# Patient Record
Sex: FEMALE | Race: OTHER | HISPANIC OR LATINO | URBAN - METROPOLITAN AREA
[De-identification: names, ages, dates, MRNs, and addresses within clinical notes are randomized per-mention and may not be internally consistent; named-entity substitution may affect disease eponyms.]

---

## 2020-10-19 ENCOUNTER — EMERGENCY (EMERGENCY)
Facility: HOSPITAL | Age: 50
LOS: 1 days | Discharge: HOME | End: 2020-10-19
Attending: EMERGENCY MEDICINE
Payer: MEDICAID

## 2020-10-19 VITALS
DIASTOLIC BLOOD PRESSURE: 87 MMHG | HEART RATE: 77 BPM | OXYGEN SATURATION: 99 % | TEMPERATURE: 99 F | SYSTOLIC BLOOD PRESSURE: 142 MMHG | RESPIRATION RATE: 17 BRPM

## 2020-10-19 VITALS
OXYGEN SATURATION: 99 % | RESPIRATION RATE: 20 BRPM | TEMPERATURE: 98 F | SYSTOLIC BLOOD PRESSURE: 162 MMHG | DIASTOLIC BLOOD PRESSURE: 80 MMHG | HEART RATE: 85 BPM | WEIGHT: 238.98 LBS | HEIGHT: 69 IN

## 2020-10-19 LAB
ABO RH CONFIRMATION: SIGNIFICANT CHANGE UP
ALBUMIN SERPL ELPH-MCNC: 3.5 G/DL — SIGNIFICANT CHANGE UP (ref 3.5–5)
ALP SERPL-CCNC: 70 U/L — SIGNIFICANT CHANGE UP (ref 40–120)
ALT FLD-CCNC: 25 U/L DA — SIGNIFICANT CHANGE UP (ref 10–60)
ANION GAP SERPL CALC-SCNC: 7 MMOL/L — SIGNIFICANT CHANGE UP (ref 5–17)
APTT BLD: 25.3 SEC — LOW (ref 27.5–35.5)
AST SERPL-CCNC: 18 U/L — SIGNIFICANT CHANGE UP (ref 10–40)
BILIRUB SERPL-MCNC: 0.4 MG/DL — SIGNIFICANT CHANGE UP (ref 0.2–1.2)
BUN SERPL-MCNC: 10 MG/DL — SIGNIFICANT CHANGE UP (ref 7–18)
CALCIUM SERPL-MCNC: 8.6 MG/DL — SIGNIFICANT CHANGE UP (ref 8.4–10.5)
CHLORIDE SERPL-SCNC: 104 MMOL/L — SIGNIFICANT CHANGE UP (ref 96–108)
CO2 SERPL-SCNC: 25 MMOL/L — SIGNIFICANT CHANGE UP (ref 22–31)
CREAT SERPL-MCNC: 0.75 MG/DL — SIGNIFICANT CHANGE UP (ref 0.5–1.3)
FERRITIN SERPL-MCNC: 3 NG/ML — LOW (ref 15–150)
GLUCOSE SERPL-MCNC: 229 MG/DL — HIGH (ref 70–99)
HCG SERPL-ACNC: <1 MIU/ML — SIGNIFICANT CHANGE UP
HCT VFR BLD CALC: 22.4 % — LOW (ref 34.5–45)
HGB BLD-MCNC: 5.8 G/DL — CRITICAL LOW (ref 11.5–15.5)
INR BLD: 1.11 RATIO — SIGNIFICANT CHANGE UP (ref 0.88–1.16)
IRON SATN MFR SERPL: 14 UG/DL — LOW (ref 40–150)
IRON SATN MFR SERPL: 3 % — LOW (ref 15–50)
MCHC RBC-ENTMCNC: 14.7 PG — LOW (ref 27–34)
MCHC RBC-ENTMCNC: 25.9 GM/DL — LOW (ref 32–36)
MCV RBC AUTO: 56.9 FL — LOW (ref 80–100)
NRBC # BLD: 0 /100 WBCS — SIGNIFICANT CHANGE UP (ref 0–0)
PLATELET # BLD AUTO: 358 K/UL — SIGNIFICANT CHANGE UP (ref 150–400)
POTASSIUM SERPL-MCNC: 3.9 MMOL/L — SIGNIFICANT CHANGE UP (ref 3.5–5.3)
POTASSIUM SERPL-SCNC: 3.9 MMOL/L — SIGNIFICANT CHANGE UP (ref 3.5–5.3)
PROT SERPL-MCNC: 7.6 G/DL — SIGNIFICANT CHANGE UP (ref 6–8.3)
PROTHROM AB SERPL-ACNC: 13.1 SEC — SIGNIFICANT CHANGE UP (ref 10.6–13.6)
RBC # BLD: 3.94 M/UL — SIGNIFICANT CHANGE UP (ref 3.8–5.2)
RBC # FLD: 19.8 % — HIGH (ref 10.3–14.5)
SODIUM SERPL-SCNC: 136 MMOL/L — SIGNIFICANT CHANGE UP (ref 135–145)
TIBC SERPL-MCNC: 418 UG/DL — SIGNIFICANT CHANGE UP (ref 250–450)
UIBC SERPL-MCNC: 404 UG/DL — HIGH (ref 110–370)
WBC # BLD: 4.8 K/UL — SIGNIFICANT CHANGE UP (ref 3.8–10.5)
WBC # FLD AUTO: 4.8 K/UL — SIGNIFICANT CHANGE UP (ref 3.8–10.5)

## 2020-10-19 PROCEDURE — 99283 EMERGENCY DEPT VISIT LOW MDM: CPT

## 2020-10-19 PROCEDURE — 93005 ELECTROCARDIOGRAM TRACING: CPT

## 2020-10-19 PROCEDURE — 36430 TRANSFUSION BLD/BLD COMPNT: CPT

## 2020-10-19 PROCEDURE — 85610 PROTHROMBIN TIME: CPT

## 2020-10-19 PROCEDURE — 36415 COLL VENOUS BLD VENIPUNCTURE: CPT

## 2020-10-19 PROCEDURE — 83550 IRON BINDING TEST: CPT

## 2020-10-19 PROCEDURE — 83540 ASSAY OF IRON: CPT

## 2020-10-19 PROCEDURE — P9040: CPT

## 2020-10-19 PROCEDURE — 80053 COMPREHEN METABOLIC PANEL: CPT

## 2020-10-19 PROCEDURE — 85730 THROMBOPLASTIN TIME PARTIAL: CPT

## 2020-10-19 PROCEDURE — 93010 ELECTROCARDIOGRAM REPORT: CPT

## 2020-10-19 PROCEDURE — 84702 CHORIONIC GONADOTROPIN TEST: CPT

## 2020-10-19 PROCEDURE — 86900 BLOOD TYPING SEROLOGIC ABO: CPT

## 2020-10-19 PROCEDURE — 82728 ASSAY OF FERRITIN: CPT

## 2020-10-19 PROCEDURE — 85027 COMPLETE CBC AUTOMATED: CPT

## 2020-10-19 PROCEDURE — 99285 EMERGENCY DEPT VISIT HI MDM: CPT | Mod: 25

## 2020-10-19 PROCEDURE — 86901 BLOOD TYPING SEROLOGIC RH(D): CPT

## 2020-10-19 PROCEDURE — 86850 RBC ANTIBODY SCREEN: CPT

## 2020-10-19 PROCEDURE — 86923 COMPATIBILITY TEST ELECTRIC: CPT

## 2020-10-19 PROCEDURE — 82962 GLUCOSE BLOOD TEST: CPT

## 2020-10-19 NOTE — ED ADULT NURSE NOTE - ED STAT RN HANDOFF DETAILS
Patient discharged home. Denies pain, sp 1 unit PRBC transfusion with no adverse reactions noted. IV removed no bleeding, swelling.

## 2020-10-19 NOTE — ED PROVIDER NOTE - OBJECTIVE STATEMENT
50 yo F pmh of anemia present from PCP office after she had out pt labs showing Fe def anemia. c/o mild SOB with intense exertion. Denies other acute complaints.

## 2020-10-19 NOTE — ED ADULT TRIAGE NOTE - CHIEF COMPLAINT QUOTE
Also diagnosed at that time with elevated bs.  C/o dyspnea on exertion w/o chest pain.  Pale.  LMP 10/2/20

## 2020-10-19 NOTE — ED PROVIDER NOTE - PROGRESS NOTE DETAILS
labs - Hgb 5.8  Pt consented for transfusion PRBCs. Will transfuse and likely dc given very mild symptoms and just started on PO iron with PCP. Repeat VS wnl. Will dc with PCP and ob gyn fu. Discussed indications for patient return to ED. Patient understood.

## 2020-10-19 NOTE — ED PROVIDER NOTE - NS ED ROS FT
CONSTITUTIONAL: no fever, no chills   EYES: no visual changes, no eye pain   ENMT: no nasal congestion, no throat pain  CARDIOVASCULAR: no chest pain, no edema, no palpitations   RESPIRATORY: no shortness of breath, no cough, +CHILD  GASTROINTESTINAL: no abdominal pain, no nausea, no vomiting, no diarrhea, no constipation   GENITOURINARY: no dysuria, no frequency  MUSCULOSKELETAL: no joint pains, no myalgias, no back pain   SKIN: no rashes  NEUROLOGICAL: no weakness, no headache, no dizziness, no slurred speech, no syncope   PSYCHIATRIC: no known mental health illness   HEME/LYMPH: no lymphadenopathy      All other ROS negative except as per HPI

## 2020-10-19 NOTE — ED PROVIDER NOTE - PATIENT PORTAL LINK FT
You can access the FollowMyHealth Patient Portal offered by [facility] by registering at the following website: [email or web address]. By joining [employer]’s FollowMyHealth portal, you will also be able to view your health information using other applications (apps) compatible with our system.

## 2020-10-19 NOTE — ED ADULT NURSE NOTE - OBJECTIVE STATEMENT
Patient sent from PCP for abnormal blood levels. Patient denies pain/discomfort. No signs of active bleeding noted.

## 2020-10-19 NOTE — ED PROVIDER NOTE - NSFOLLOWUPINSTRUCTIONS_ED_ALL_ED_FT
[facility]                                                                                Iron Deficiency Anemia, Adult      Iron-deficiency anemia is when you have a low amount of red blood cells or hemoglobin. This happens because you have too little iron in your body. Hemoglobin carries oxygen to parts of the body. Anemia can cause your body to not get enough oxygen. It may or may not cause symptoms.      Follow these instructions at home:    Medicines     •Take over-the-counter and prescription medicines only as told by your doctor. This includes iron pills (supplements) and vitamins.    •If you cannot handle taking iron pills by mouth, ask your doctor about getting iron through:  •A vein (intravenously).      •A shot (injection) into a muscle.        •Take iron pills when your stomach is empty. If you cannot handle this, take them with food.      • Do not drink milk or take antacids at the same time as your iron pills.      •To prevent trouble pooping (constipation), eat fiber or take medicine (stool softener) as told by your doctor.        Eating and drinking    •Talk with your doctor before changing the foods you eat. He or she may tell you to eat foods that have a lot of iron, such as:  •Liver.      •Lowfat (lean) beef.      •Breads and cereals that have iron added to them (fortified breads and cereals).      •Eggs.      •Dried fruit.      •Dark green, leafy vegetables.        •Drink enough fluid to keep your pee (urine) clear or pale yellow.    •Eat fresh fruits and vegetables that are high in vitamin C. They help your body to use iron. Foods with a lot of vitamin C include:  •Oranges.      •Peppers.      •Tomatoes.      •Mangoes.        General instructions     •Return to your normal activities as told by your doctor. Ask your doctor what activities are safe for you.      •Keep yourself clean, and keep things clean around you (your surroundings). Anemia can make you get sick more easily.      •Keep all follow-up visits as told by your doctor. This is important.        Contact a doctor if:    •You feel sick to your stomach (nauseous).      •You throw up (vomit).      •You feel weak.      •You are sweating for no clear reason.    •You have trouble pooping, such as:  •Pooping (having a bowel movement) less than 3 times a week.      •Straining to poop.      •Having poop that is hard, dry, or larger than normal.      •Feeling full or bloated.      •Pain in the lower belly.      •Not feeling better after pooping.          Get help right away if:    •You pass out (faint). If this happens, do not drive yourself to the hospital. Call your local emergency services (911 in the [place]      •You have chest pain.    •You have shortness of breath that:  •Is very bad.      •Gets worse with physical activity.        •You have a fast heartbeat.      •You get light-headed when getting up from sitting or lying down.      This information is not intended to replace advice given to you by your health care provider. Make sure you discuss any questions you have with your health care provider.      Document Released: 01/20/2012 Document Revised: 11/30/2018 Document Reviewed: 09/06/2017    Elsevier Patient Education © 2020 Elsevier Inc.

## 2020-10-19 NOTE — ED ADULT NURSE NOTE - CHPI ED NUR SYMPTOMS NEG
no dizziness/no tingling/no pain/no chills/no decreased eating/drinking/no nausea/no vomiting/no weakness/no fever

## 2020-10-19 NOTE — ED ADULT NURSE NOTE - NSIMPLEMENTINTERV_GEN_ALL_ED
Implemented All Universal Safety Interventions:  [place] to call system. Call bell, personal items and telephone within reach. Instruct patient to call for assistance. Room bathroom lighting operational. Non-slip footwear when patient is off stretcher. Physically safe environment: no spills, clutter or unnecessary equipment. Stretcher in lowest position, wheels locked, appropriate side rails in place.

## 2020-11-02 ENCOUNTER — EMERGENCY (EMERGENCY)
Facility: HOSPITAL | Age: 50
LOS: 1 days | Discharge: HOME | End: 2020-11-02
Attending: EMERGENCY MEDICINE
Payer: MEDICAID

## 2020-11-02 VITALS
RESPIRATION RATE: 18 BRPM | HEART RATE: 90 BPM | SYSTOLIC BLOOD PRESSURE: 157 MMHG | HEIGHT: 69 IN | DIASTOLIC BLOOD PRESSURE: 84 MMHG | WEIGHT: 229.94 LBS | TEMPERATURE: 99 F | OXYGEN SATURATION: 99 %

## 2020-11-02 LAB
ALBUMIN SERPL ELPH-MCNC: 3.5 G/DL — SIGNIFICANT CHANGE UP (ref 3.5–5)
ALP SERPL-CCNC: 71 U/L — SIGNIFICANT CHANGE UP (ref 40–120)
ALT FLD-CCNC: 22 U/L DA — SIGNIFICANT CHANGE UP (ref 10–60)
ANION GAP SERPL CALC-SCNC: 7 MMOL/L — SIGNIFICANT CHANGE UP (ref 5–17)
APTT BLD: 27.8 SEC — SIGNIFICANT CHANGE UP (ref 27.5–35.5)
AST SERPL-CCNC: 16 U/L — SIGNIFICANT CHANGE UP (ref 10–40)
BASOPHILS # BLD AUTO: 0.03 K/UL — SIGNIFICANT CHANGE UP (ref 0–0.2)
BASOPHILS NFR BLD AUTO: 0.5 % — SIGNIFICANT CHANGE UP (ref 0–2)
BILIRUB SERPL-MCNC: 0.3 MG/DL — SIGNIFICANT CHANGE UP (ref 0.2–1.2)
BUN SERPL-MCNC: 7 MG/DL — SIGNIFICANT CHANGE UP (ref 7–18)
CALCIUM SERPL-MCNC: 9 MG/DL — SIGNIFICANT CHANGE UP (ref 8.4–10.5)
CHLORIDE SERPL-SCNC: 104 MMOL/L — SIGNIFICANT CHANGE UP (ref 96–108)
CO2 SERPL-SCNC: 26 MMOL/L — SIGNIFICANT CHANGE UP (ref 22–31)
CREAT SERPL-MCNC: 0.76 MG/DL — SIGNIFICANT CHANGE UP (ref 0.5–1.3)
EOSINOPHIL # BLD AUTO: 0.15 K/UL — SIGNIFICANT CHANGE UP (ref 0–0.5)
EOSINOPHIL NFR BLD AUTO: 2.3 % — SIGNIFICANT CHANGE UP (ref 0–6)
GLUCOSE SERPL-MCNC: 192 MG/DL — HIGH (ref 70–99)
HCT VFR BLD CALC: 27.6 % — LOW (ref 34.5–45)
HGB BLD-MCNC: 7.1 G/DL — LOW (ref 11.5–15.5)
IMM GRANULOCYTES NFR BLD AUTO: 0.5 % — SIGNIFICANT CHANGE UP (ref 0–1.5)
INR BLD: 1.05 RATIO — SIGNIFICANT CHANGE UP (ref 0.88–1.16)
LYMPHOCYTES # BLD AUTO: 1.33 K/UL — SIGNIFICANT CHANGE UP (ref 1–3.3)
LYMPHOCYTES # BLD AUTO: 20.6 % — SIGNIFICANT CHANGE UP (ref 13–44)
MCHC RBC-ENTMCNC: 16.1 PG — LOW (ref 27–34)
MCHC RBC-ENTMCNC: 25.7 GM/DL — LOW (ref 32–36)
MCV RBC AUTO: 62.6 FL — LOW (ref 80–100)
MONOCYTES # BLD AUTO: 0.37 K/UL — SIGNIFICANT CHANGE UP (ref 0–0.9)
MONOCYTES NFR BLD AUTO: 5.7 % — SIGNIFICANT CHANGE UP (ref 2–14)
NEUTROPHILS # BLD AUTO: 4.55 K/UL — SIGNIFICANT CHANGE UP (ref 1.8–7.4)
NEUTROPHILS NFR BLD AUTO: 70.4 % — SIGNIFICANT CHANGE UP (ref 43–77)
NRBC # BLD: 0 /100 WBCS — SIGNIFICANT CHANGE UP (ref 0–0)
PLATELET # BLD AUTO: 467 K/UL — HIGH (ref 150–400)
POTASSIUM SERPL-MCNC: 4.3 MMOL/L — SIGNIFICANT CHANGE UP (ref 3.5–5.3)
POTASSIUM SERPL-SCNC: 4.3 MMOL/L — SIGNIFICANT CHANGE UP (ref 3.5–5.3)
PROT SERPL-MCNC: 7.7 G/DL — SIGNIFICANT CHANGE UP (ref 6–8.3)
PROTHROM AB SERPL-ACNC: 12.5 SEC — SIGNIFICANT CHANGE UP (ref 10.6–13.6)
RBC # BLD: 4.41 M/UL — SIGNIFICANT CHANGE UP (ref 3.8–5.2)
RBC # FLD: 25.5 % — HIGH (ref 10.3–14.5)
SODIUM SERPL-SCNC: 137 MMOL/L — SIGNIFICANT CHANGE UP (ref 135–145)
WBC # BLD: 6.46 K/UL — SIGNIFICANT CHANGE UP (ref 3.8–10.5)
WBC # FLD AUTO: 6.46 K/UL — SIGNIFICANT CHANGE UP (ref 3.8–10.5)

## 2020-11-02 PROCEDURE — 99284 EMERGENCY DEPT VISIT MOD MDM: CPT

## 2020-11-02 PROCEDURE — 86900 BLOOD TYPING SEROLOGIC ABO: CPT

## 2020-11-02 PROCEDURE — 36415 COLL VENOUS BLD VENIPUNCTURE: CPT

## 2020-11-02 PROCEDURE — 86901 BLOOD TYPING SEROLOGIC RH(D): CPT

## 2020-11-02 PROCEDURE — 99283 EMERGENCY DEPT VISIT LOW MDM: CPT

## 2020-11-02 PROCEDURE — 85025 COMPLETE CBC W/AUTO DIFF WBC: CPT

## 2020-11-02 PROCEDURE — 85610 PROTHROMBIN TIME: CPT

## 2020-11-02 PROCEDURE — 86850 RBC ANTIBODY SCREEN: CPT

## 2020-11-02 PROCEDURE — 85730 THROMBOPLASTIN TIME PARTIAL: CPT

## 2020-11-02 PROCEDURE — 80053 COMPREHEN METABOLIC PANEL: CPT

## 2020-11-02 NOTE — ED PROVIDER NOTE - GASTROINTESTINAL, MLM
Mammography Services    Thank you for visiting [facility]     Today you had a procedure in the Imaging [facility] at [facility].  It was supervised by a radiologist with special education in mammography.    Davis Mendoza  4/16/2019    Today's Procedure:      Diagnostic Mammogram, Bilateral  Breast Ultrasound, Left  Consult with Nurse    Results:     Probably benign:  The 0.5 cm x 0.4 cm x 0.5 cm mass in the left breast at 3 o'clock is consistent with a complex cyst and is probably benign.   The asymmetry in the left breast at 2 o'clock likely represents fibroglandular tissue and is probably benign.   The 3 masses in the left breast  likely represent complex cysts and are probably benign.   A follow-up mammogram and an ultrasound in 6 months is recommended to demonstrate stability.               You will receive a letter in the mail that summarizes the results of today's exam.  Your continued breast health is important to us.  Please refer to this information for future reference.    As a result of today's procedure, the radiologist is recommending:      Monthly breast self exam  Yearly breast exam done by healthcare provider  Left Follow up mammogram and ultrasound in 6 months     Scheduled           Mammography Services   Screening mammography - usually consists of four to six views of the breasts and is appropriate for patients with no current breast problems.    Diagnostic additional view or follow-up mammography, often called \"problem solving\" mammography, seeks to evaluate possible abnormal areas in the breast.    Breast ultrasound is used to evaluate possible abnormal areas to determine if the area is a cyst or solid mass.    Cyst aspiration is the insertion of a fine needle into a cyst and the removal of fluid from the cyst.    Biopsy requires the removal of a small sample of the breast tissue for further evaluation by a pathologist.    According to the American Cancer Society, 80%  of all breast biopsies are benign (ACS, 2009).    [facility]  [address] [address]    To schedule an appointment please call [phone].    To reach your Breast Health Coordinator, please call   [place]  - [phone]   Ayanna GRANT       Additional Educational Resources:  For additional resources regarding your symptoms, diagnosis, or further health information, please visit the Health Resources section on [email or web address] or the Online Health Resources section in [employer].       Abdomen soft, non-tender, non-distended. Normal bowel sounds. No guarding or rebound.

## 2020-11-02 NOTE — ED PROVIDER NOTE - PATIENT PORTAL LINK FT
You can access the FollowMyHealth Patient Portal offered by [facility] by registering at the following website: [email or web address]. By joining [employer]’s FollowMyHealth portal, you will also be able to view your health information using other applications (apps) compatible with our system.

## 2020-11-02 NOTE — ED PROVIDER NOTE - PROGRESS NOTE DETAILS
Patient only taking ferrous sulfate 325mg once a day. I advised her to increase it to 3 times a day. Patient is resting comfortably, NAD. Hb now 7.1 with no CV symptoms. Does not need transfusion. I spoke with Dr. Corey, who is in agreement. f/u with Dr. Corey in 1-2 days. Return to the ED immediately if getting worse, not improving, or if having any new or troubling symptoms.

## 2020-11-02 NOTE — ED PROVIDER NOTE - OBJECTIVE STATEMENT
Patient sent in by PMD by Dr. Corey for blood transfusion. Hb on 10/30 was 6.9. Patient feels tired but denies cp, sob, ap, dizziness. Patient just finished menstrual period. Anemia thought to be due to heavy periods, though she is also scheduled for colonoscopy to look for other sources of bleeding.

## 2020-11-02 NOTE — ED PROVIDER NOTE - NSFOLLOWUPINSTRUCTIONS_ED_ALL_ED_FT
Anemia    WHAT YOU NEED TO KNOW:    Anemia is a low number of red blood cells or a low amount of hemoglobin in your red blood cells. Hemoglobin is a protein that helps carry oxygen throughout your body. Red blood cells use iron to create hemoglobin. Anemia may develop if your body does not have enough iron. It may also develop if your body does not make enough red blood cells or they die faster than your body can make them.     DISCHARGE INSTRUCTIONS:    Call 911 or have someone call 911 for any of the following:   •You lose consciousness.      •You have severe chest pain.      Return to the emergency department if:   •You have dark or bloody bowel movements.          Contact your healthcare provider if:   •Your symptoms are worse, even after treatment.      •You have questions or concerns about your condition or care.      Medicines:   •Iron or folic acid supplements help increase your red blood cell and hemoglobin levels.       •Vitamin B12 injections may help boost your red blood cell level and decrease your symptoms. Ask your healthcare provider how to inject B12.      •Take your medicine as directed. Contact your healthcare provider if you think your medicine is not helping or if you have side effects. Tell him of her if you are allergic to any medicine. Keep a list of the medicines, vitamins, and herbs you take. Include the amounts, and when and why you take them. Bring the list or the pill bottles to follow-up visits. Carry your medicine list with you in case of an emergency.      Prevent anemia: Eat healthy foods rich in iron and vitamin C. Nuts, meat, dark leafy green vegetables, and beans are high in iron and protein. Vitamin C helps your body absorb iron. Foods rich in vitamin C include oranges and other citrus fruits. Ask your healthcare provider for a list of other foods that are high in iron or vitamin C. Ask if you need to be on a special diet.     Follow up with your healthcare provider as directed: Write down your questions so you remember to ask them during your visits.        © Copyright [employer] 2020           back to top                          © Copyright [employer] 2020               Iron Rich Diet    WHAT YOU NEED TO KNOW:    An iron-rich diet includes foods that are good sources of iron. People need extra iron during childhood, adolescence (teenage years), and pregnancy. Iron is a mineral that your body needs to make hemoglobin. Hemoglobin is part of your blood and helps carry oxygen from your lungs to the rest of your body. You may need to follow this diet to treat or prevent a low blood iron level or iron deficiency anemia.     DISCHARGE INSTRUCTIONS:    Daily iron needs:   •Males: ?1 to 3 years old: 7 mg      ?4 to 8 years old: 10 mg      ?9 to 13 years old: 8 mg      ?14 to 18 years old: 11 mg      ?19 years and older: 8 mg      •Females: ?1 to 3 years old: 7 mg      ?4 to 8 years old: 10 mg      ?9 to 13 years old: 8 mg      ?14 to 18 years old: 15 mg      ?19 to 50 years: 18 mg      ?Over 51 years old: 8 mg      ?Pregnant women: 27 mg        Foods that contain iron:   •Meat, fish, and poultry are good sources of iron. They contain heme iron, a form of iron that your body absorbs very well. Fruit, vegetables, eggs, and grains such as pasta, rice, and cereal also contain iron. They contain nonheme iron, a form of iron that is not absorbed as well as heme iron. You can absorb more iron from these foods by eating a food that is high in vitamin C at the same time. You can also absorb more nonheme iron by eating a food from the meat, fish, and poultry group at the same time.      •Fish and shellfish contain some mercury, a metal that can be harmful to the body. Children and unborn babies are at higher risk for harm caused by mercury. Children and pregnant women should avoid eating fish high in mercury, such as shark and swordfish. They should also eat only fish that are lower in mercury, such as salmon, canned light tuna, and catfish. Limit the amount of low-mercury fish and shellfish you eat to less than 12 ounces per week.       Iron-rich foods:   •Foods that contain 2 mg or more per serving: ?3 ounces of cooked beef (fly, eye of round) or cooked turkey (dark meat)      ?½ cup of beans (black, kidney, or lentil, or soybeans)      ?½ cup of tofu      ?1 medium baked potato      ?1 cup of cooked artichoke or cooked spinach      ?¾ cup of instant oatmeal      ?1 cup of corn flakes      •Foods that contain 1 to 2 mg per serving: ?3 ounces of chicken      ?3 ounces of pork      ?3 ounces of turkey (light meat)      ?3 ounces of light tuna      ?½ cup of seedless, packed raisins      ?1 slice of whole-wheat or white bread        Good sources of vitamin C: Eat a serving of vitamin C with any iron-rich food to help your body absorb more iron. The following fruits and vegetables are good sources of vitamin C:  •1 cup of fresh orange juice (124 mg) or pink grapefruit juice (83 mg)      •1 cup of strawberries (106 mg)      •1 cup of diced cantaloupe (68 mg)       •1 cup of sweet yellow pepper (283 mg)      •1 cup of fresh, boiled broccoli (116 mg) or cooked brussels sprouts (97 mg)      •1 cup of kale (53 mg)      •1 cup of tomato juice (45 mg)      Other guidelines to follow:   •Tea and coffee can decrease the amount of iron that your body absorbs from iron-rich foods. Drink coffee and tea separately from meals that contain iron-rich foods.      •Children over the age of 1 year only need about 24 ounces of cow's milk each day. When children drink too much milk, they may eat fewer iron-rich foods. This may cause them to have a low level of iron in their blood.      Risks: If you do not eat iron-rich foods and vitamin C every day, you may have low blood iron levels. This may lead to iron deficiency anemia, especially during periods when your body needs extra iron. Iron deficiency anemia may cause problems with your child’s growth and development. If you have iron deficiency anemia, you may develop other health problems.        © Copyright [employer] 2020           back to top                          © Copyright [employer] 2020

## 2021-03-05 PROBLEM — D50.0 IRON DEFICIENCY ANEMIA SECONDARY TO BLOOD LOSS (CHRONIC): Chronic | Status: ACTIVE | Noted: 2020-11-02

## 2021-03-05 PROBLEM — I10 ESSENTIAL (PRIMARY) HYPERTENSION: Chronic | Status: ACTIVE | Noted: 2020-11-02

## 2021-03-10 ENCOUNTER — OUTPATIENT (OUTPATIENT)
Dept: OUTPATIENT SERVICES | Facility: HOSPITAL | Age: 51
LOS: 1 days | End: 2021-03-10
Payer: MEDICAID

## 2021-03-10 VITALS
SYSTOLIC BLOOD PRESSURE: 132 MMHG | OXYGEN SATURATION: 99 % | DIASTOLIC BLOOD PRESSURE: 79 MMHG | RESPIRATION RATE: 17 BRPM | HEART RATE: 89 BPM | TEMPERATURE: 98 F | WEIGHT: 242.07 LBS | HEIGHT: 69 IN

## 2021-03-10 DIAGNOSIS — N93.9 ABNORMAL UTERINE AND VAGINAL BLEEDING, UNSPECIFIED: ICD-10-CM

## 2021-03-10 DIAGNOSIS — Z01.818 ENCOUNTER FOR OTHER PREPROCEDURAL EXAMINATION: ICD-10-CM

## 2021-03-10 PROBLEM — Z00.00 ENCOUNTER FOR PREVENTIVE HEALTH EXAMINATION: Status: ACTIVE | Noted: 2021-03-10

## 2021-03-10 LAB
A1C WITH ESTIMATED AVERAGE GLUCOSE RESULT: 6.1 % — HIGH (ref 4–5.6)
ESTIMATED AVERAGE GLUCOSE: 128 MG/DL — HIGH (ref 68–114)
HCG SERPL-ACNC: <1 MIU/ML — SIGNIFICANT CHANGE UP
HCT VFR BLD CALC: 34.4 % — LOW (ref 34.5–45)
HGB BLD-MCNC: 10.4 G/DL — LOW (ref 11.5–15.5)
MCHC RBC-ENTMCNC: 23 PG — LOW (ref 27–34)
MCHC RBC-ENTMCNC: 30.2 GM/DL — LOW (ref 32–36)
MCV RBC AUTO: 76.1 FL — LOW (ref 80–100)
NRBC # BLD: 0 /100 WBCS — SIGNIFICANT CHANGE UP (ref 0–0)
PLATELET # BLD AUTO: 384 K/UL — SIGNIFICANT CHANGE UP (ref 150–400)
RBC # BLD: 4.52 M/UL — SIGNIFICANT CHANGE UP (ref 3.8–5.2)
RBC # FLD: 20.2 % — HIGH (ref 10.3–14.5)
WBC # BLD: 7.32 K/UL — SIGNIFICANT CHANGE UP (ref 3.8–10.5)
WBC # FLD AUTO: 7.32 K/UL — SIGNIFICANT CHANGE UP (ref 3.8–10.5)

## 2021-03-10 PROCEDURE — 86901 BLOOD TYPING SEROLOGIC RH(D): CPT

## 2021-03-10 PROCEDURE — 86900 BLOOD TYPING SEROLOGIC ABO: CPT

## 2021-03-10 PROCEDURE — 83036 HEMOGLOBIN GLYCOSYLATED A1C: CPT

## 2021-03-10 PROCEDURE — 86850 RBC ANTIBODY SCREEN: CPT

## 2021-03-10 PROCEDURE — 93005 ELECTROCARDIOGRAM TRACING: CPT

## 2021-03-10 PROCEDURE — 36415 COLL VENOUS BLD VENIPUNCTURE: CPT

## 2021-03-10 PROCEDURE — 84702 CHORIONIC GONADOTROPIN TEST: CPT

## 2021-03-10 PROCEDURE — 93010 ELECTROCARDIOGRAM REPORT: CPT

## 2021-03-10 PROCEDURE — 85027 COMPLETE CBC AUTOMATED: CPT

## 2021-03-10 PROCEDURE — G0463: CPT

## 2021-03-10 NOTE — H&P PST ADULT - HISTORY OF PRESENT ILLNESS
This 49 yo female with a PMHX of DM, HTN, Iron deficiency  presents to PST for a scheduled  D&C Hysteroscopy with endometrial sampling with Dr Nettles  on 3/17/21. She has had "heavy periods with pain for years, I live on Advil. I use the purple pads because I bleed so heavy"  She is electing to have this procedure today.

## 2021-03-10 NOTE — H&P PST ADULT - EYES
C-Difficile admission screening and protocol:     * Admitted with diarrhea? NO     *Prior history of C-Diff. In last 3 months? NO     *Antibiotic use in the past 6-8 weeks? NO     *Prior hospitalization or nursing home in the last month?   NO detailed exam conjunctiva clear

## 2021-03-10 NOTE — H&P PST ADULT - ASSESSMENT
This 51 yo female with a PMHX of DM, HTN, Iron deficiency  presents to PST for a scheduled  S&C Hysteroscopy with endometrial sampling with Dr Nettles  on 3/17/21.

## 2021-03-10 NOTE — H&P PST ADULT - NSICDXPASTMEDICALHX_GEN_ALL_CORE_FT
PAST MEDICAL HISTORY:  Diabetes mellitus     Hypertension     Iron deficiency anemia due to chronic blood loss

## 2021-03-10 NOTE — H&P PST ADULT - NSANTHOSAYNRD_GEN_A_CORE
No. [facility] screening performed.  STOP BANG Legend: 0-2 = LOW Risk; 3-4 = INTERMEDIATE Risk; 5-8 = HIGH Risk

## 2021-03-10 NOTE — H&P PST ADULT - NSICDXPROBLEM_GEN_ALL_CORE_FT
PROBLEM DIAGNOSES  Problem: Abnormal uterine and vaginal bleeding  Assessment and Plan: PST: CBC,CMP,A1C,EKG,T&S  Medical evaluation with Dr. Corey  All preoperative instructions reviewed with patient who verbalized understanding.   Avoid all NSAID/ASA containing products as well as all herbal/vitamin supplements. Tylenol only for pain/headache.   A1c pending. Would need endo clearance if a1c comes back 9 or above. All Diabetes medication reviewed with patient. Fingerstick am of surgery. Verbalized understanding   Covid swab at [place] date TBD. Pt verbalized understanding.        PROBLEM DIAGNOSES  Problem: Abnormal uterine and vaginal bleeding  Assessment and Plan: PST: CBC,CMP,A1C,EKG,T&S  Medical evaluation with Dr. Corey  All preoperative instructions reviewed with patient who verbalized understanding.   Avoid all NSAID/ASA containing products as well as all herbal/vitamin supplements. Tylenol only for pain/headache.   A1c pending. Would need endo clearance if A1c pending. Would need endo clearance if A1c pending.  All Diabetes medication reviewed with patient. Fingerstick am of surgery  Covid swab at [place] date TBD. Pt verbalized understanding.       R/O PROBLEM DIAGNOSES  Problem: Diabetes mellitus  Assessment and Plan: Pt advised to call PCP regarding MC and possible need for Endocrine clearance. This NP called PCP office and left message requesting callback for most recent labs and to discuss the possiblity of patient needing an Endocrinew referral as she does not see Endocrinology now.

## 2021-03-10 NOTE — H&P PST ADULT - ENDOCRINE COMMENTS
does not know  what last A1C last A1C "went up I think they said 9.1. Dr Corey wanted to up my Metformin but I said no because it upsets my stomach"

## 2021-03-13 DIAGNOSIS — Z01.818 ENCOUNTER FOR OTHER PREPROCEDURAL EXAMINATION: ICD-10-CM

## 2021-03-14 ENCOUNTER — APPOINTMENT (OUTPATIENT)
Dept: DISASTER EMERGENCY | Facility: CLINIC | Age: 51
End: 2021-03-14

## 2021-03-15 LAB — SARS-COV-2 N GENE NPH QL NAA+PROBE: NOT DETECTED

## 2021-03-16 ENCOUNTER — TRANSCRIPTION ENCOUNTER (OUTPATIENT)
Age: 51
End: 2021-03-16

## 2021-03-16 RX ORDER — SODIUM CHLORIDE 9 MG/ML
1000 INJECTION, SOLUTION INTRAVENOUS
Refills: 0 | Status: DISCONTINUED | OUTPATIENT
Start: 2021-03-17 | End: 2021-03-31

## 2021-03-16 RX ORDER — HYDROMORPHONE HYDROCHLORIDE 2 MG/ML
0.5 INJECTION INTRAMUSCULAR; INTRAVENOUS; SUBCUTANEOUS
Refills: 0 | Status: DISCONTINUED | OUTPATIENT
Start: 2021-03-17 | End: 2021-03-19

## 2021-03-17 ENCOUNTER — OUTPATIENT (OUTPATIENT)
Dept: OUTPATIENT SERVICES | Facility: HOSPITAL | Age: 51
LOS: 1 days | End: 2021-03-17
Payer: MEDICAID

## 2021-03-17 ENCOUNTER — RESULT REVIEW (OUTPATIENT)
Age: 51
End: 2021-03-17

## 2021-03-17 VITALS
SYSTOLIC BLOOD PRESSURE: 100 MMHG | OXYGEN SATURATION: 100 % | DIASTOLIC BLOOD PRESSURE: 60 MMHG | TEMPERATURE: 98 F | RESPIRATION RATE: 12 BRPM | HEART RATE: 80 BPM

## 2021-03-17 VITALS
DIASTOLIC BLOOD PRESSURE: 61 MMHG | TEMPERATURE: 98 F | HEIGHT: 69 IN | WEIGHT: 238.98 LBS | RESPIRATION RATE: 14 BRPM | HEART RATE: 85 BPM | OXYGEN SATURATION: 100 % | SYSTOLIC BLOOD PRESSURE: 140 MMHG

## 2021-03-17 DIAGNOSIS — N93.9 ABNORMAL UTERINE AND VAGINAL BLEEDING, UNSPECIFIED: ICD-10-CM

## 2021-03-17 PROCEDURE — 88305 TISSUE EXAM BY PATHOLOGIST: CPT | Mod: 26

## 2021-03-17 PROCEDURE — 88305 TISSUE EXAM BY PATHOLOGIST: CPT

## 2021-03-17 PROCEDURE — 82962 GLUCOSE BLOOD TEST: CPT

## 2021-03-17 PROCEDURE — 58558 HYSTEROSCOPY BIOPSY: CPT

## 2021-03-17 RX ORDER — INSULIN LISPRO 100/ML
2 VIAL (ML) SUBCUTANEOUS ONCE
Refills: 0 | Status: COMPLETED | OUTPATIENT
Start: 2021-03-17 | End: 2021-03-17

## 2021-03-17 RX ORDER — ACETAMINOPHEN 500 MG
650 TABLET ORAL ONCE
Refills: 0 | Status: COMPLETED | OUTPATIENT
Start: 2021-03-17 | End: 2021-03-17

## 2021-03-17 RX ORDER — SODIUM CHLORIDE 9 MG/ML
1000 INJECTION, SOLUTION INTRAVENOUS
Refills: 0 | Status: DISCONTINUED | OUTPATIENT
Start: 2021-03-17 | End: 2021-03-17

## 2021-03-17 RX ADMIN — Medication 2 UNIT(S): at 09:17

## 2021-03-17 RX ADMIN — SODIUM CHLORIDE 75 MILLILITER(S): 9 INJECTION, SOLUTION INTRAVENOUS at 11:38

## 2021-03-17 RX ADMIN — Medication 650 MILLIGRAM(S): at 08:51

## 2021-03-17 RX ADMIN — SODIUM CHLORIDE 50 MILLILITER(S): 9 INJECTION, SOLUTION INTRAVENOUS at 09:20

## 2021-03-17 NOTE — BRIEF OPERATIVE NOTE - OPERATION/FINDINGS
10 week size uterus   bilateral ostia visualized   EMC  collected   TXA administered due more bleeding than ancipitated   endometial cavity visualized under reduced pressure, no active bleeding APPRECIATED   PICTURES obtained   Hemostasis visualized   Fluid deficit 700cc 10 week size uterus   bilateral ostia visualized   EMC  collected   TXA administered due more bleeding than anticipated   endometrial cavity visualized under reduced pressure, no active bleeding APPRECIATED   PICTURES obtained   Hemostasis visualized   Fluid deficit 700cc

## 2021-03-17 NOTE — ASU DISCHARGE PLAN (ADULT/PEDIATRIC) - CARE PROVIDER_API CALL
[address]  [address]                    SURGICAL PATH RPT PROCEDURE   
_______________________________________________________________________________
 
Name:       ADRI FAULKNER             Room:           [address] [place]  
M.R.#:  [id number]      Account #:      [id number]  
Admission:  03/26/18     Date of Birth:  [birth year]  
Discharge:                             Report #:    [id number]
                                                         Path Case #: [id number] 
_______________________________________________________________________________
 
  
PATHOLOGY REPORT 
    
COLLECTION DATE: 3/26/2018   RECEIVED DATE: 3/26/2018  
 SUBMITTING PHYS: Dr. Deion Wagner
OTHER PHYS:
Dr. Oh White
   
SPECIMEN(S) RECEIVED:
A.R knee bone
    
* * * * * * * * * * * *
   
FINAL DIAGNOSIS:
Bone right knee, total knee replacement:
- Benign meniscus and synovium and benign bone and cartilage with
degenerative changes.
(CONNOR:db; 3/28/2018) 
 
PATHOLOGIST:   Immanuel Cote M.D. 
REPORT ELECTRONICALLY SIGNED BY:   Immanuel Cote M.D.
DATE/TIME:   3/29/2018 12:30
    
* * * * * * * * * * * *
 
GROSS PATHOLOGY:
Received in formalin labeled "Adri Faulkner, bone right knee," are
multiple segments of bone, including tibial plateau, measuring 13.3 x
10.5 x 1.9 cm in aggregate dimensions admixed with soft tissue;
meniscus is present. Upon extensive sectioning, eburnation of the
articular surfaces is not grossly evident.  Representative sections
of bone and soft tissue are submitted in cassette A1, following
decalcification.
(DAC; 3/27/2018)
 
 
CLINICAL HISTORY:
Right knee degenerative joint disease
 
INITIAL CPT CODE(S):
A; 60700, 51039
Professional services performed by [facility] at 
[facility] 
[address] [place] [address]
 
  Technical services performed by [facility] at [address] [place] [address],
[facility] [address] [address].
 
 
[address]  [address]                    SURGICAL PATH RPT PROCEDURE   
_______________________________________________________________________________
 
Name:       ADRI FAULKNER             Room:           [address] [place]  
[facility].#:  [id number]      Account #:      [id number]  
Admission:  03/26/18     Date of Birth:  [birth year]  
Discharge:                             Report #:    [id number]
                                                         Path Case #: [id number] 
_______________________________________________________________________________
 
   
[facility]
[address] [address]
PHONE:  [phone]
DIRECTOR:  Spencer W. Kerley, M.D.
* * *  END OF REPORT  * * * Cayla Nettles)  Obstetrics and Gynecology Obstetrics and Gynocology  [address] [place] [address]  Phone: [phone]  Fax: [phone]  Follow Up Time: 2 weeks

## 2021-03-17 NOTE — BRIEF OPERATIVE NOTE - NSICDXBRIEFPROCEDURE_GEN_ALL_CORE_FT
PROCEDURES:  Hysteroscopy with dilation and curettage of uterus and surgical removal of endometrium or leiomyoma 17-Mar-2021 11:13:12  Cayla Nettles

## 2021-03-24 ENCOUNTER — TRANSCRIPTION ENCOUNTER (OUTPATIENT)
Age: 51
End: 2021-03-24

## 2021-03-26 ENCOUNTER — APPOINTMENT (OUTPATIENT)
Dept: SURGICAL ONCOLOGY | Facility: CLINIC | Age: 51
End: 2021-03-26
Payer: MEDICAID

## 2021-03-26 VITALS
SYSTOLIC BLOOD PRESSURE: 122 MMHG | OXYGEN SATURATION: 99 % | WEIGHT: 239 LBS | HEIGHT: 69 IN | DIASTOLIC BLOOD PRESSURE: 74 MMHG | HEART RATE: 80 BPM | BODY MASS INDEX: 35.4 KG/M2

## 2021-03-26 DIAGNOSIS — Z98.890 OTHER SPECIFIED POSTPROCEDURAL STATES: ICD-10-CM

## 2021-03-26 DIAGNOSIS — Z80.0 FAMILY HISTORY OF MALIGNANT NEOPLASM OF DIGESTIVE ORGANS: ICD-10-CM

## 2021-03-26 DIAGNOSIS — Z80.9 FAMILY HISTORY OF MALIGNANT NEOPLASM, UNSPECIFIED: ICD-10-CM

## 2021-03-26 DIAGNOSIS — E11.9 TYPE 2 DIABETES MELLITUS W/OUT COMPLICATIONS: ICD-10-CM

## 2021-03-26 DIAGNOSIS — Z56.0 UNEMPLOYMENT, UNSPECIFIED: ICD-10-CM

## 2021-03-26 PROCEDURE — 99204 OFFICE O/P NEW MOD 45 MIN: CPT

## 2021-03-26 PROCEDURE — 99072 ADDL SUPL MATRL&STAF TM PHE: CPT

## 2021-03-26 RX ORDER — IBUPROFEN 200 MG/1
TABLET, COATED ORAL
Refills: 0 | Status: ACTIVE | COMMUNITY

## 2021-03-26 RX ORDER — ACETAMINOPHEN 500 MG/1
500 TABLET, COATED ORAL
Refills: 0 | Status: ACTIVE | COMMUNITY

## 2021-03-26 RX ORDER — METFORMIN HYDROCHLORIDE 625 MG/1
TABLET ORAL
Refills: 0 | Status: ACTIVE | COMMUNITY

## 2021-03-26 RX ORDER — LISINOPRIL 10 MG/1
10 TABLET ORAL
Refills: 0 | Status: ACTIVE | COMMUNITY

## 2021-03-26 SDOH — ECONOMIC STABILITY - INCOME SECURITY: UNEMPLOYMENT, UNSPECIFIED: Z56.0

## 2021-03-26 NOTE — PAST MEDICAL HISTORY
[Menstruating] : The patient is menstruating [Menarche Age ____] : age at menarche was [unfilled] [Definite ___ (Date)] : the last menstrual period was [unfilled] [Total Preg ___] : G[unfilled] [Live Births ___] : P[unfilled]  [AB Spont ___] : miscarriages: [unfilled]  [History of Hormone Replacement Treatment] : has no history of hormone replacement treatment [FreeTextEntry5] : D&C 3/17/2021 [FreeTextEntry2] : takes care of nephew, currently 21 [FreeTextEntry6] : none [FreeTextEntry7] : 8 months [FreeTextEntry8] : n/a

## 2021-03-26 NOTE — CONSULT LETTER
[Dear  ___] : Dear  [unfilled], [Consult Letter:] : I had the pleasure of evaluating your patient, [unfilled]. [Please see my note below.] : Please see my note below. [Consult Closing:] : Thank you very much for allowing me to participate in the care of this patient.  If you have any questions, please do not hesitate to contact me. [Sincerely,] : Sincerely, [FreeTextEntry3] : Rin Kelly MD\par Breast Surgeon\par Division of Surgical Oncology\par Department of Surgery\par [address] [place] [address]\[place] [address] \par Tel: [phone]\par Fax: [phone]\par Email: [email or web address]

## 2021-03-26 NOTE — ASSESSMENT
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 While many can be monitored with serial q6 month imaging, Ms. England's masses are large, 3 cm and 5 cm. She also complains of back pain from her G-cup breasts. I offered her consultation with a plastic surgeon regarding B/L reduction in conjunction with excision of the masses and calcifications at the same time. \par \par Plan:\par - referral to see Dr. Robb with plastics surgery regarding reduction\par - will need AMANDA placement for R breast to localize the calcifications and also the smaller mass (palpable, but is subtle, and AMANDA will assist with definitive localization)\par - medical pre-operative risk assessment/clearance requested\par \par \par \par

## 2021-03-26 NOTE — PHYSICAL EXAM
[Normocephalic] : normocephalic [Atraumatic] : atraumatic [Sclera nonicteric] : sclera nonicteric [Supple] : supple [No Supraclavicular Adenopathy] : no supraclavicular adenopathy [No Cervical Adenopathy] : no cervical adenopathy [Examined in the supine and seated position] : examined in the supine and seated position [Symmetrical] : symmetrical [Bra Size: ___] : Bra Size: [unfilled] [Grade 3] : Ptosis Grade 3 [No Nipple Retraction] : no left nipple retraction [No Nipple Discharge] : no left nipple discharge [Breast Nipple Inversion] : nipples not inverted [Breast Nipple Retraction] : nipples not retracted [Breast Nipple Flattening] : nipples not flattened [Breast Nipple Fissures] : nipples not fissured [Breast Abnormal Lactation (Galactorrhea)] : no galactorrhea [Breast Abnormal Secretion Bloody Fluid] : no bloody discharge [Breast Abnormal Secretion Serous Fluid] : no serous discharge [Breast Abnormal Secretion Opalescent Fluid] : no milky discharge [No Axillary Lymphadenopathy] : no left axillary lymphadenopathy [No Edema] : no edema [No Swelling] : no swelling [Full ROM] : full range of motion [No Rashes] : no rashes [de-identified] : non-labored respirations  [de-identified] : 2-3 cm mass at 6-7:00, adjacent to NAC, soft, well-circumscribed, mobile. No overlying skin changes. [de-identified] : 5 cm mass at 2-3:00 6 cm from the nipple, soft, well-circumscribed, mobile. No overlying skin changes

## 2021-03-26 NOTE — HISTORY OF PRESENT ILLNESS
[FreeTextEntry1] : The patient is a 50 year old female referred by Dr. Corey for consultation regarding a Left breast mass.\par \par The patient reports that she previously had a Left breast mass in 2004 that was aspirated and reported got smaller/resolved, likely a cyst. She then notes feeling a left breast mass around 12/2019 but did not seek medical attention until recently when she thought it had gotten larger.\par \par Prior imaging:\par 10/23/2020 ([facility]) DM showed heterogeneously dense breasts.\par - R UOQ indeterminate microcalcifications were seen, N10\par - L UOQ 5 cm hyperdense mass, N4\par \par 10/23/2020 US\par  - R 3 x 1.6 x 1.2 cm lobulated isoechoic mass 7:00 N3\par  - L 4.7 x 4.7 x 2.0 lobulated complicated cystic isoechoic mass 2:00 N6\par  - LN negative\par \par 2/11/2021 R stereo bx\par  - hyalinized FA with calcifications, rec 6 mo F/U with mammogram\par \par She has not had any prior breast imaging. Today, the patient denies breast pain, skin changes, or nipple discharge.  She does note that she has longstanding back pain from her large (G cup) breasts.

## 2021-04-15 ENCOUNTER — APPOINTMENT (OUTPATIENT)
Dept: PLASTIC SURGERY | Facility: CLINIC | Age: 51
End: 2021-04-15
Payer: MEDICAID

## 2021-04-15 ENCOUNTER — EMERGENCY (EMERGENCY)
Facility: HOSPITAL | Age: 51
LOS: 1 days | Discharge: HOME | End: 2021-04-15
Attending: EMERGENCY MEDICINE
Payer: MEDICAID

## 2021-04-15 VITALS
WEIGHT: 242.07 LBS | HEIGHT: 69 IN | RESPIRATION RATE: 18 BRPM | TEMPERATURE: 99 F | HEART RATE: 76 BPM | DIASTOLIC BLOOD PRESSURE: 66 MMHG | OXYGEN SATURATION: 100 % | SYSTOLIC BLOOD PRESSURE: 135 MMHG

## 2021-04-15 VITALS
HEART RATE: 78 BPM | DIASTOLIC BLOOD PRESSURE: 75 MMHG | SYSTOLIC BLOOD PRESSURE: 131 MMHG | RESPIRATION RATE: 18 BRPM | OXYGEN SATURATION: 100 % | TEMPERATURE: 99 F

## 2021-04-15 LAB
ALBUMIN SERPL ELPH-MCNC: 3.7 G/DL — SIGNIFICANT CHANGE UP (ref 3.5–5)
ALP SERPL-CCNC: 80 U/L — SIGNIFICANT CHANGE UP (ref 40–120)
ALT FLD-CCNC: 26 U/L DA — SIGNIFICANT CHANGE UP (ref 10–60)
ANION GAP SERPL CALC-SCNC: 8 MMOL/L — SIGNIFICANT CHANGE UP (ref 5–17)
APTT BLD: 25.7 SEC — LOW (ref 27.5–35.5)
AST SERPL-CCNC: 12 U/L — SIGNIFICANT CHANGE UP (ref 10–40)
BASOPHILS # BLD AUTO: 0.05 K/UL — SIGNIFICANT CHANGE UP (ref 0–0.2)
BASOPHILS NFR BLD AUTO: 0.9 % — SIGNIFICANT CHANGE UP (ref 0–2)
BILIRUB SERPL-MCNC: 0.3 MG/DL — SIGNIFICANT CHANGE UP (ref 0.2–1.2)
BUN SERPL-MCNC: 13 MG/DL — SIGNIFICANT CHANGE UP (ref 7–18)
CALCIUM SERPL-MCNC: 8.8 MG/DL — SIGNIFICANT CHANGE UP (ref 8.4–10.5)
CHLORIDE SERPL-SCNC: 105 MMOL/L — SIGNIFICANT CHANGE UP (ref 96–108)
CO2 SERPL-SCNC: 24 MMOL/L — SIGNIFICANT CHANGE UP (ref 22–31)
CREAT SERPL-MCNC: 0.69 MG/DL — SIGNIFICANT CHANGE UP (ref 0.5–1.3)
EOSINOPHIL # BLD AUTO: 0.16 K/UL — SIGNIFICANT CHANGE UP (ref 0–0.5)
EOSINOPHIL NFR BLD AUTO: 2.8 % — SIGNIFICANT CHANGE UP (ref 0–6)
GLUCOSE SERPL-MCNC: 188 MG/DL — HIGH (ref 70–99)
HCG SERPL-ACNC: <1 MIU/ML — SIGNIFICANT CHANGE UP
HCT VFR BLD CALC: 24.7 % — LOW (ref 34.5–45)
HGB BLD-MCNC: 6.9 G/DL — CRITICAL LOW (ref 11.5–15.5)
HIV 1 & 2 AB SERPL IA.RAPID: SIGNIFICANT CHANGE UP
IMM GRANULOCYTES NFR BLD AUTO: 0.9 % — SIGNIFICANT CHANGE UP (ref 0–1.5)
INR BLD: 1.04 RATIO — SIGNIFICANT CHANGE UP (ref 0.88–1.16)
LYMPHOCYTES # BLD AUTO: 1.41 K/UL — SIGNIFICANT CHANGE UP (ref 1–3.3)
LYMPHOCYTES # BLD AUTO: 24.3 % — SIGNIFICANT CHANGE UP (ref 13–44)
MCHC RBC-ENTMCNC: 21.2 PG — LOW (ref 27–34)
MCHC RBC-ENTMCNC: 27.9 GM/DL — LOW (ref 32–36)
MCV RBC AUTO: 76 FL — LOW (ref 80–100)
MONOCYTES # BLD AUTO: 0.45 K/UL — SIGNIFICANT CHANGE UP (ref 0–0.9)
MONOCYTES NFR BLD AUTO: 7.7 % — SIGNIFICANT CHANGE UP (ref 2–14)
NEUTROPHILS # BLD AUTO: 3.69 K/UL — SIGNIFICANT CHANGE UP (ref 1.8–7.4)
NEUTROPHILS NFR BLD AUTO: 63.4 % — SIGNIFICANT CHANGE UP (ref 43–77)
NRBC # BLD: 0 /100 WBCS — SIGNIFICANT CHANGE UP (ref 0–0)
PLATELET # BLD AUTO: 398 K/UL — SIGNIFICANT CHANGE UP (ref 150–400)
POTASSIUM SERPL-MCNC: 3.9 MMOL/L — SIGNIFICANT CHANGE UP (ref 3.5–5.3)
POTASSIUM SERPL-SCNC: 3.9 MMOL/L — SIGNIFICANT CHANGE UP (ref 3.5–5.3)
PROT SERPL-MCNC: 7.7 G/DL — SIGNIFICANT CHANGE UP (ref 6–8.3)
PROTHROM AB SERPL-ACNC: 12.3 SEC — SIGNIFICANT CHANGE UP (ref 10.6–13.6)
RBC # BLD: 3.25 M/UL — LOW (ref 3.8–5.2)
RBC # FLD: 17.3 % — HIGH (ref 10.3–14.5)
SODIUM SERPL-SCNC: 137 MMOL/L — SIGNIFICANT CHANGE UP (ref 135–145)
WBC # BLD: 5.81 K/UL — SIGNIFICANT CHANGE UP (ref 3.8–10.5)
WBC # FLD AUTO: 5.81 K/UL — SIGNIFICANT CHANGE UP (ref 3.8–10.5)

## 2021-04-15 PROCEDURE — 86850 RBC ANTIBODY SCREEN: CPT

## 2021-04-15 PROCEDURE — 99204 OFFICE O/P NEW MOD 45 MIN: CPT

## 2021-04-15 PROCEDURE — P9040: CPT

## 2021-04-15 PROCEDURE — 99284 EMERGENCY DEPT VISIT MOD MDM: CPT | Mod: 25

## 2021-04-15 PROCEDURE — 85025 COMPLETE CBC W/AUTO DIFF WBC: CPT

## 2021-04-15 PROCEDURE — 85730 THROMBOPLASTIN TIME PARTIAL: CPT

## 2021-04-15 PROCEDURE — 80053 COMPREHEN METABOLIC PANEL: CPT

## 2021-04-15 PROCEDURE — 99284 EMERGENCY DEPT VISIT MOD MDM: CPT

## 2021-04-15 PROCEDURE — 86923 COMPATIBILITY TEST ELECTRIC: CPT

## 2021-04-15 PROCEDURE — 86901 BLOOD TYPING SEROLOGIC RH(D): CPT

## 2021-04-15 PROCEDURE — 36430 TRANSFUSION BLD/BLD COMPNT: CPT

## 2021-04-15 PROCEDURE — 86900 BLOOD TYPING SEROLOGIC ABO: CPT

## 2021-04-15 PROCEDURE — 85610 PROTHROMBIN TIME: CPT

## 2021-04-15 PROCEDURE — 86703 HIV-1/HIV-2 1 RESULT ANTBDY: CPT

## 2021-04-15 PROCEDURE — 84702 CHORIONIC GONADOTROPIN TEST: CPT

## 2021-04-15 PROCEDURE — 36415 COLL VENOUS BLD VENIPUNCTURE: CPT

## 2021-04-15 RX ORDER — ACETAMINOPHEN 500 MG
650 TABLET ORAL ONCE
Refills: 0 | Status: COMPLETED | OUTPATIENT
Start: 2021-04-15 | End: 2021-04-15

## 2021-04-15 RX ADMIN — Medication 650 MILLIGRAM(S): at 22:33

## 2021-04-15 RX ADMIN — Medication 650 MILLIGRAM(S): at 19:20

## 2021-04-15 NOTE — ED PROVIDER NOTE - NSFOLLOWUPINSTRUCTIONS_ED_ALL_ED_FT
Iron Rich Diet    AMBULATORY CARE:    An iron-rich diet includes foods that are good sources of iron. People need extra iron during childhood, adolescence (teenage years), and pregnancy. Iron is a mineral that your body needs to make hemoglobin. Hemoglobin is part of your blood and helps carry oxygen from your lungs to the rest of your body. Eat iron-rich foods and vitamin C every day to prevent iron deficiency anemia. Iron deficiency anemia can lead to other health problems in adults and growth or development problems in children.    Daily iron needs:   •Males: ?1 to 3 years old: 7 mg      ?4 to 8 years old: 10 mg      ?9 to 13 years old: 8 mg      ?14 to 18 years old: 11 mg      ?19 years and older: 8 mg      •Females: ?1 to 3 years old: 7 mg      ?4 to 8 years old: 10 mg      ?9 to 13 years old: 8 mg      ?14 to 18 years old: 15 mg      ?19 to 50 years: 18 mg      ?Over 51 years old: 8 mg      ?Pregnant women: 27 mg        Foods that contain iron:   •Meat, fish, and poultry are good sources of iron. They contain heme iron, a form of iron that your body absorbs very well. Fruit, vegetables, eggs, and grains such as pasta, rice, and cereal also contain iron. They contain nonheme iron, a form of iron that is not absorbed as well as heme iron. You can absorb more iron from these foods by eating a food that is high in vitamin C at the same time. You can also absorb more nonheme iron by eating a food from the meat, fish, and poultry group at the same time.      •Fish and shellfish contain some mercury, a metal that can be harmful to the body. Children and unborn babies are at higher risk for harm caused by mercury. Children and pregnant women should avoid eating fish high in mercury, such as shark and swordfish. They should also eat only fish that are lower in mercury, such as salmon, canned light tuna, and catfish. Limit the amount of low-mercury fish and shellfish you eat to less than 12 ounces per week.      Iron-rich foods:   •Foods that contain 2 mg or more per serving: ?3 ounces of cooked beef (fly, eye of round) or cooked turkey (dark meat)      ?½ cup of beans (black, kidney, or lentil, or soybeans)      ?½ cup of tofu      ?1 medium baked potato      ?1 cup of cooked artichoke or cooked spinach      ?¾ cup of instant oatmeal      ?1 cup of corn flakes      •Foods that contain 1 to 2 mg per serving: ?3 ounces of chicken      ?3 ounces of pork      ?3 ounces of turkey (light meat)      ?3 ounces of light tuna      ?½ cup of seedless, packed raisins      ?1 slice of whole-wheat or white bread        Good sources of vitamin C: Eat a serving of vitamin C with any iron-rich food to help your body absorb more iron. The following fruits and vegetables are good sources of vitamin C:  •1 cup of fresh orange juice (124 mg) or pink grapefruit juice (83 mg)      •1 cup of strawberries (106 mg)      •1 cup of diced cantaloupe (68 mg)      •1 cup of sweet yellow pepper (283 mg)      •1 cup of fresh, boiled broccoli (116 mg) or cooked brussels sprouts (97 mg)      •1 cup of kale (53 mg)      •1 cup of tomato juice (45 mg)    Sources of Vitamin C         Other guidelines to follow:   •Tea and coffee can decrease the amount of iron that your body absorbs from iron-rich foods. Drink coffee and tea separately from meals that contain iron-rich foods.      •Have foods and liquids high in calcium separately from iron-rich foods. Calcium prevents iron from being absorbed. Cow's milk and products made from it, such as cheese and yogurt, are high in calcium. Children older than 1 year only need about 24 ounces of cow's milk each day. Other foods high in calcium include leafy greens, green vegetables, almonds, and canned sardines.

## 2021-04-15 NOTE — ED PROVIDER NOTE - PATIENT PORTAL LINK FT
You can access the FollowMyHealth Patient Portal offered by [facility] by registering at the following website: [email or web address]. By joining [employer]’s FollowMyHealth portal, you will also be able to view your health information using other applications (apps) compatible with our system.

## 2021-04-15 NOTE — ED ADULT NURSE NOTE - ED STAT RN HANDOFF DETAILS 3
pt.remained stable. 1st unit of PRBC completed at 2100 with no a/r noted. re-eval by  with order to d/c home.

## 2021-04-15 NOTE — ED PROVIDER NOTE - OBJECTIVE STATEMENT
49 y/o F is sent to the ED for blood transfusion by PCP Dr. Corey. Patient notes having vaginal bleeding and had hysteroscopy 1 month ago. Patient reports period finished. Patient relates to feeling slightly tired. Denies chest pain, palpitations, shortness of breath, dizziness or any other acute complaints. Doctor sent for CBC Monday and today was told it was 6.1 and to go to the hospital.

## 2021-04-15 NOTE — ED ADULT NURSE NOTE - OBJECTIVE STATEMENT
Patient present to ED after having labs drawn in doctors office on monday called by phone today to inform patient that H/H was low she needs to come to ED for transfusion. Patient denies any dizziness ,c/o generalized weakness

## 2021-04-15 NOTE — ED PROVIDER NOTE - CLINICAL SUMMARY MEDICAL DECISION MAKING FREE TEXT BOX
49 y/o F with anemia. Will do blood transfusion and reassess. 51 y/o F with anemia. Will do blood transfusion and reassess.  hemoglobin 6.9, will transfuse 1 unit

## 2021-04-15 NOTE — ED ADULT NURSE NOTE - ED STAT RN HANDOFF DETAILS 2
received pt.in  bed at 1910 pt.is  alert and oriented x3.denies pain,1st unit of PRBC in progress with  no a/r noted.

## 2021-04-15 NOTE — ED ADULT NURSE NOTE - ED STAT RN HANDOFF DETAILS 4
pt.remained stable. s/p blood transfusion with no a/r noted. denies pain.  left in the ed in stable condition. not in distress

## 2021-06-04 ENCOUNTER — OUTPATIENT (OUTPATIENT)
Dept: OUTPATIENT SERVICES | Facility: HOSPITAL | Age: 51
LOS: 1 days | End: 2021-06-04
Payer: MEDICAID

## 2021-06-04 VITALS
TEMPERATURE: 97 F | WEIGHT: 244.05 LBS | DIASTOLIC BLOOD PRESSURE: 84 MMHG | HEART RATE: 82 BPM | OXYGEN SATURATION: 99 % | RESPIRATION RATE: 16 BRPM | HEIGHT: 69 IN | SYSTOLIC BLOOD PRESSURE: 126 MMHG

## 2021-06-04 DIAGNOSIS — D24.1 BENIGN NEOPLASM OF RIGHT BREAST: ICD-10-CM

## 2021-06-04 DIAGNOSIS — E11.9 TYPE 2 DIABETES MELLITUS WITHOUT COMPLICATIONS: ICD-10-CM

## 2021-06-04 DIAGNOSIS — Z01.818 ENCOUNTER FOR OTHER PREPROCEDURAL EXAMINATION: ICD-10-CM

## 2021-06-04 DIAGNOSIS — N63.20 UNSPECIFIED LUMP IN THE LEFT BREAST, UNSPECIFIED QUADRANT: ICD-10-CM

## 2021-06-04 DIAGNOSIS — Z98.890 OTHER SPECIFIED POSTPROCEDURAL STATES: Chronic | ICD-10-CM

## 2021-06-04 DIAGNOSIS — N62 HYPERTROPHY OF BREAST: ICD-10-CM

## 2021-06-04 DIAGNOSIS — N63.0 UNSPECIFIED LUMP IN UNSPECIFIED BREAST: ICD-10-CM

## 2021-06-04 LAB
ANION GAP SERPL CALC-SCNC: 13 MMOL/L — SIGNIFICANT CHANGE UP (ref 5–17)
BUN SERPL-MCNC: 11 MG/DL — SIGNIFICANT CHANGE UP (ref 7–23)
CALCIUM SERPL-MCNC: 9.2 MG/DL — SIGNIFICANT CHANGE UP (ref 8.4–10.5)
CHLORIDE SERPL-SCNC: 103 MMOL/L — SIGNIFICANT CHANGE UP (ref 96–108)
CO2 SERPL-SCNC: 21 MMOL/L — LOW (ref 22–31)
CREAT SERPL-MCNC: 0.57 MG/DL — SIGNIFICANT CHANGE UP (ref 0.5–1.3)
GLUCOSE SERPL-MCNC: 146 MG/DL — HIGH (ref 70–99)
HCT VFR BLD CALC: 31.5 % — LOW (ref 34.5–45)
HGB BLD-MCNC: 9.2 G/DL — LOW (ref 11.5–15.5)
MCHC RBC-ENTMCNC: 22.2 PG — LOW (ref 27–34)
MCHC RBC-ENTMCNC: 29.2 GM/DL — LOW (ref 32–36)
MCV RBC AUTO: 75.9 FL — LOW (ref 80–100)
NRBC # BLD: 0 /100 WBCS — SIGNIFICANT CHANGE UP (ref 0–0)
PLATELET # BLD AUTO: 434 K/UL — HIGH (ref 150–400)
POTASSIUM SERPL-MCNC: 4.2 MMOL/L — SIGNIFICANT CHANGE UP (ref 3.5–5.3)
POTASSIUM SERPL-SCNC: 4.2 MMOL/L — SIGNIFICANT CHANGE UP (ref 3.5–5.3)
RBC # BLD: 4.15 M/UL — SIGNIFICANT CHANGE UP (ref 3.8–5.2)
RBC # FLD: 21 % — HIGH (ref 10.3–14.5)
SODIUM SERPL-SCNC: 137 MMOL/L — SIGNIFICANT CHANGE UP (ref 135–145)
WBC # BLD: 8.27 K/UL — SIGNIFICANT CHANGE UP (ref 3.8–10.5)
WBC # FLD AUTO: 8.27 K/UL — SIGNIFICANT CHANGE UP (ref 3.8–10.5)

## 2021-06-04 PROCEDURE — G0463: CPT

## 2021-06-04 PROCEDURE — 85027 COMPLETE CBC AUTOMATED: CPT

## 2021-06-04 PROCEDURE — 83036 HEMOGLOBIN GLYCOSYLATED A1C: CPT

## 2021-06-04 PROCEDURE — 80048 BASIC METABOLIC PNL TOTAL CA: CPT

## 2021-06-04 RX ORDER — METFORMIN HYDROCHLORIDE 850 MG/1
0 TABLET ORAL
Qty: 0 | Refills: 0 | DISCHARGE

## 2021-06-04 RX ORDER — SODIUM CHLORIDE 9 MG/ML
3 INJECTION INTRAMUSCULAR; INTRAVENOUS; SUBCUTANEOUS EVERY 8 HOURS
Refills: 0 | Status: DISCONTINUED | OUTPATIENT
Start: 2021-06-18 | End: 2021-07-02

## 2021-06-04 RX ORDER — LIDOCAINE HCL 20 MG/ML
0.2 VIAL (ML) INJECTION ONCE
Refills: 0 | Status: DISCONTINUED | OUTPATIENT
Start: 2021-06-18 | End: 2021-07-02

## 2021-06-04 NOTE — H&P PST ADULT - ASSESSMENT
50 year old nulliparous female PMH of Htn, DM2, anemia & left breast cyst aspiration in 2004( benign), reports having recurrence of a growing cyst in left breast in 2019, did not seek medical advice until mass grew in size. Also reports having a cyst in right breast per the latest mammogram in 10/2020   with c/o itchiness of nipple. Denies breast pain, discharge or inverted nipple. Presents for scheduled left breast excisional biopsy, right breast excisional biopsy x2, post suleman  reflector placement, bilateral breast reduction on 06/18/2021.    ***S/P Covid vaccine , second dose on Moderna  06/07/2021, So scheduled for STANTON-Covid 2 swab testing on 06/15/2021, Advised to quarantine after covid test.

## 2021-06-04 NOTE — H&P PST ADULT - HISTORY OF PRESENT ILLNESS
50 year old female PMH of HT,. DM2,  50 year old nulliparous female PMH of Htn, DM2, anemia & left breast cyst aspiration in 2004( benign), reports having recurrence of a growing cyst in left breast in 2019, did not seek medical advice until mass grew in size. Also reports having a cyst in right breast per the latest mammogram in 10/2020   with c/o itchiness of nipple. Denies breast pain, discharge or inverted nipple. Presents for scheduled left breast excisional biopsy, right breast excisional biopsy x2, post suleman  reflector placement, bilateral breast reduction on 06/18/2021.    ***S/P Covid vaccine , second dose on Moderna  06/07/2021, So scheduled for STANTON-Covid 2 swab testing on 06/15/2021, Advised to quarantine after covid test.  Patient  denies any symptoms of covid infection , not travelled  outside [place]/ [place] with in the last 21 days , not visited any sick person/ anyone tested positive for covid test.   Denies fever, cough, shortness of breadth, diarrhea, throat pain, changes in taste/smell or any rash.

## 2021-06-04 NOTE — H&P PST ADULT - NSICDXPASTSURGICALHX_GEN_ALL_CORE_FT
PAST SURGICAL HISTORY:  No significant past surgical history      PAST SURGICAL HISTORY:  History of D&C

## 2021-06-04 NOTE — H&P PST ADULT - NSICDXPROBLEM_GEN_ALL_CORE_FT
PROBLEM DIAGNOSES  Problem: Breast lump  Assessment and Plan:  Presents for scheduled left breast excisional biopsy, right breast excisional biopsy x2, post suleman  reflector placement, bilateral breast reduction on 06/18/2021.  UCG Dos  ***S/P Covid vaccine , second dose on Moderna  06/07/2021, So scheduled for STANTON-Covid 2 swab testing on 06/15/2021, Advised to quarantine after covid test.   Instructed to inform surgeon & seek medical attention if any changes in health  status like fever, chills, rash, infections, chest pain or any changes in health status like loss of taste or smell, throat pain or diarhea . PST  instructions given in written/ explained about NPO, PREOP SKIN CARE  with antibacterial chlorhexidine wash x3 days preop(Hibicleans given) and ARRIVAL TIME  2 hours prior to OR time.     Problem: DM (diabetes mellitus)  Assessment and Plan: Will follow up with labs/ fingerstick. Preop HgA1c ordered Instructed to hold metformin ( last dose on 06/17 AM . STAT FS  on the day of surgery ordered.

## 2021-06-04 NOTE — H&P PST ADULT - HEALTH CARE MAINTENANCE
Flu vaccine taken ,   moderna first dose on 05/10/2020  Covid vaccine up to date with 1 doses (  05/1010   )  On yearly Annual physicals/ follow up regularly.  Last colonoscopy -iup to date/ normal Flu vaccine taken ,   moderna first dose on 05/10/2020  Covid vaccine up to date with second dose pending on 06/07/21  On yearly Annual physicals/ follow up regularly.  Last colonoscopy -iup to date/ normal

## 2021-06-04 NOTE — H&P PST ADULT - NSICDXPASTMEDICALHX_GEN_ALL_CORE_FT
PAST MEDICAL HISTORY:  Diabetes mellitus 2    Hypertension     Iron deficiency anemia due to chronic blood loss

## 2021-06-04 NOTE — H&P PST ADULT - NSANTHTOTALSCORECAL_ENT_A_CORE
From: Lucy Almanzar  To: Aman Monroy MD  Sent: 12/22/2020 9:48 AM EST  Subject: Prescription Question    I would like to get a larger size refill of Triamcinolone 0.1 ointmnet. Dr. Karilyn Pallas had said to see if this helped and if so, he could prescribe it in a larger size It is helping and I am out almost out of the original prescription. There are two refills of original size, so I can get refill if needed. Thanks.   Miguel Chan 2

## 2021-06-05 LAB
A1C WITH ESTIMATED AVERAGE GLUCOSE RESULT: 6.5 % — HIGH (ref 4–5.6)
ESTIMATED AVERAGE GLUCOSE: 140 MG/DL — HIGH (ref 68–114)

## 2021-06-07 ENCOUNTER — RESULT REVIEW (OUTPATIENT)
Age: 51
End: 2021-06-07

## 2021-06-07 ENCOUNTER — OUTPATIENT (OUTPATIENT)
Dept: OUTPATIENT SERVICES | Facility: HOSPITAL | Age: 51
LOS: 1 days | End: 2021-06-07
Payer: MEDICAID

## 2021-06-07 DIAGNOSIS — Z98.890 OTHER SPECIFIED POSTPROCEDURAL STATES: Chronic | ICD-10-CM

## 2021-06-07 DIAGNOSIS — C50.919 MALIGNANT NEOPLASM OF UNSPECIFIED SITE OF UNSPECIFIED FEMALE BREAST: ICD-10-CM

## 2021-06-07 PROCEDURE — 88321 CONSLTJ&REPRT SLD PREP ELSWR: CPT

## 2021-06-10 NOTE — CONSULT LETTER
[Dear  ___] : Dear  [unfilled], [Consult Letter:] : I had the pleasure of evaluating your patient, [unfilled]. [Please see my note below.] : Please see my note below. [Consult Closing:] : Thank you very much for allowing me to participate in the care of this patient.  If you have any questions, please do not hesitate to contact me. [Sincerely,] : Sincerely, [FreeTextEntry3] : Aurelio Robb MD

## 2021-06-17 ENCOUNTER — TRANSCRIPTION ENCOUNTER (OUTPATIENT)
Age: 51
End: 2021-06-17

## 2021-06-17 ENCOUNTER — RESULT REVIEW (OUTPATIENT)
Age: 51
End: 2021-06-17

## 2021-06-17 ENCOUNTER — APPOINTMENT (OUTPATIENT)
Dept: ULTRASOUND IMAGING | Facility: CLINIC | Age: 51
End: 2021-06-17
Payer: MEDICAID

## 2021-06-17 ENCOUNTER — APPOINTMENT (OUTPATIENT)
Dept: MAMMOGRAPHY | Facility: CLINIC | Age: 51
End: 2021-06-17
Payer: MEDICAID

## 2021-06-17 ENCOUNTER — OUTPATIENT (OUTPATIENT)
Dept: OUTPATIENT SERVICES | Facility: HOSPITAL | Age: 51
LOS: 1 days | End: 2021-06-17
Payer: MEDICAID

## 2021-06-17 DIAGNOSIS — Z00.8 ENCOUNTER FOR OTHER GENERAL EXAMINATION: ICD-10-CM

## 2021-06-17 DIAGNOSIS — Z98.890 OTHER SPECIFIED POSTPROCEDURAL STATES: Chronic | ICD-10-CM

## 2021-06-17 PROCEDURE — C1739: CPT

## 2021-06-17 PROCEDURE — 19281 PERQ DEVICE BREAST 1ST IMAG: CPT | Mod: RT,59

## 2021-06-17 PROCEDURE — 19285 PERQ DEV BREAST 1ST US IMAG: CPT | Mod: RT

## 2021-06-17 PROCEDURE — 19281 PERQ DEVICE BREAST 1ST IMAG: CPT

## 2021-06-17 PROCEDURE — 19285 PERQ DEV BREAST 1ST US IMAG: CPT

## 2021-06-18 ENCOUNTER — APPOINTMENT (OUTPATIENT)
Dept: SURGICAL ONCOLOGY | Facility: HOSPITAL | Age: 51
End: 2021-06-18

## 2021-06-18 ENCOUNTER — RESULT REVIEW (OUTPATIENT)
Age: 51
End: 2021-06-18

## 2021-06-18 ENCOUNTER — APPOINTMENT (OUTPATIENT)
Dept: PLASTIC SURGERY | Facility: HOSPITAL | Age: 51
End: 2021-06-18
Payer: MEDICAID

## 2021-06-18 ENCOUNTER — OUTPATIENT (OUTPATIENT)
Dept: OUTPATIENT SERVICES | Facility: HOSPITAL | Age: 51
LOS: 1 days | End: 2021-06-18
Payer: MEDICAID

## 2021-06-18 ENCOUNTER — APPOINTMENT (OUTPATIENT)
Dept: MAMMOGRAPHY | Facility: IMAGING CENTER | Age: 51
End: 2021-06-18

## 2021-06-18 VITALS
SYSTOLIC BLOOD PRESSURE: 122 MMHG | RESPIRATION RATE: 16 BRPM | WEIGHT: 244.05 LBS | DIASTOLIC BLOOD PRESSURE: 82 MMHG | OXYGEN SATURATION: 98 % | TEMPERATURE: 98 F | HEIGHT: 69 IN | HEART RATE: 73 BPM

## 2021-06-18 VITALS
RESPIRATION RATE: 16 BRPM | DIASTOLIC BLOOD PRESSURE: 64 MMHG | SYSTOLIC BLOOD PRESSURE: 127 MMHG | OXYGEN SATURATION: 96 % | TEMPERATURE: 98 F | HEART RATE: 84 BPM

## 2021-06-18 DIAGNOSIS — N63.20 UNSPECIFIED LUMP IN THE LEFT BREAST, UNSPECIFIED QUADRANT: ICD-10-CM

## 2021-06-18 DIAGNOSIS — Z98.890 OTHER SPECIFIED POSTPROCEDURAL STATES: Chronic | ICD-10-CM

## 2021-06-18 DIAGNOSIS — N62 HYPERTROPHY OF BREAST: ICD-10-CM

## 2021-06-18 DIAGNOSIS — D24.1 BENIGN NEOPLASM OF RIGHT BREAST: ICD-10-CM

## 2021-06-18 DIAGNOSIS — Z01.818 ENCOUNTER FOR OTHER PREPROCEDURAL EXAMINATION: ICD-10-CM

## 2021-06-18 LAB — GLUCOSE BLDC GLUCOMTR-MCNC: 141 MG/DL — HIGH (ref 70–99)

## 2021-06-18 PROCEDURE — C9399: CPT

## 2021-06-18 PROCEDURE — 82962 GLUCOSE BLOOD TEST: CPT

## 2021-06-18 PROCEDURE — 19318 BREAST REDUCTION: CPT | Mod: 50

## 2021-06-18 PROCEDURE — 76098 X-RAY EXAM SURGICAL SPECIMEN: CPT | Mod: 26

## 2021-06-18 PROCEDURE — 88305 TISSUE EXAM BY PATHOLOGIST: CPT

## 2021-06-18 PROCEDURE — 19301 PARTIAL MASTECTOMY: CPT | Mod: 50

## 2021-06-18 PROCEDURE — C1889: CPT

## 2021-06-18 PROCEDURE — 19125 EXCISION BREAST LESION: CPT | Mod: RT

## 2021-06-18 PROCEDURE — 19120 REMOVAL OF BREAST LESION: CPT | Mod: LT

## 2021-06-18 PROCEDURE — 76098 X-RAY EXAM SURGICAL SPECIMEN: CPT

## 2021-06-18 PROCEDURE — 88307 TISSUE EXAM BY PATHOLOGIST: CPT

## 2021-06-18 PROCEDURE — 88307 TISSUE EXAM BY PATHOLOGIST: CPT | Mod: 26

## 2021-06-18 PROCEDURE — 88305 TISSUE EXAM BY PATHOLOGIST: CPT | Mod: 26

## 2021-06-18 RX ORDER — CEFAZOLIN SODIUM 1 G
2000 VIAL (EA) INJECTION ONCE
Refills: 0 | Status: COMPLETED | OUTPATIENT
Start: 2021-06-18 | End: 2021-06-18

## 2021-06-18 RX ORDER — ONDANSETRON 8 MG/1
4 TABLET, FILM COATED ORAL ONCE
Refills: 0 | Status: COMPLETED | OUTPATIENT
Start: 2021-06-18 | End: 2021-06-18

## 2021-06-18 RX ORDER — HYDROMORPHONE HYDROCHLORIDE 2 MG/ML
1 INJECTION INTRAMUSCULAR; INTRAVENOUS; SUBCUTANEOUS ONCE
Refills: 0 | Status: DISCONTINUED | OUTPATIENT
Start: 2021-06-18 | End: 2021-06-18

## 2021-06-18 RX ORDER — HYDROMORPHONE HYDROCHLORIDE 2 MG/ML
0.5 INJECTION INTRAMUSCULAR; INTRAVENOUS; SUBCUTANEOUS
Refills: 0 | Status: DISCONTINUED | OUTPATIENT
Start: 2021-06-18 | End: 2021-06-18

## 2021-06-18 RX ORDER — APREPITANT 80 MG/1
40 CAPSULE ORAL ONCE
Refills: 0 | Status: COMPLETED | OUTPATIENT
Start: 2021-06-18 | End: 2021-06-18

## 2021-06-18 RX ORDER — OXYCODONE HYDROCHLORIDE 5 MG/1
1 TABLET ORAL
Qty: 10 | Refills: 0
Start: 2021-06-18

## 2021-06-18 RX ADMIN — HYDROMORPHONE HYDROCHLORIDE 1 MILLIGRAM(S): 2 INJECTION INTRAMUSCULAR; INTRAVENOUS; SUBCUTANEOUS at 14:33

## 2021-06-18 RX ADMIN — ONDANSETRON 4 MILLIGRAM(S): 8 TABLET, FILM COATED ORAL at 14:32

## 2021-06-18 RX ADMIN — APREPITANT 40 MILLIGRAM(S): 80 CAPSULE ORAL at 09:31

## 2021-06-18 NOTE — BRIEF OPERATIVE NOTE - OPERATION/FINDINGS
bilateral oncoplastic reduction with superomedial pedicle
B/l breast excision biopsy. Right breast lesions x2 removed w/ suleman localization.  noted to be within specimen on post-excision xray. Left breast lesions removed. Followed by plastics for closure/bilateral breast reduction

## 2021-06-18 NOTE — BRIEF OPERATIVE NOTE - NSICDXBRIEFPOSTOP_GEN_ALL_CORE_FT
POST-OP DIAGNOSIS:  Macromastia 18-Jun-2021 13:27:13  Kierra Kiran  
POST-OP DIAGNOSIS:  Masses of both breasts 18-Jun-2021 11:58:31  Trevon Waldron

## 2021-06-18 NOTE — BRIEF OPERATIVE NOTE - NSICDXBRIEFPREOP_GEN_ALL_CORE_FT
PRE-OP DIAGNOSIS:  Macromastia 18-Jun-2021 13:27:05  Kierra Kiran  
PRE-OP DIAGNOSIS:  Masses of both breasts 18-Jun-2021 11:58:19  Trevon Waldron

## 2021-06-18 NOTE — BRIEF OPERATIVE NOTE - NSICDXBRIEFPROCEDURE_GEN_ALL_CORE_FT
PROCEDURES:  Biopsy of both breasts with lumpectomy 18-Jun-2021 11:58:04  Trevon Waldron  
PROCEDURES:  Reduction of both breasts 18-Jun-2021 13:26:57  Kierra Kiran

## 2021-06-18 NOTE — ASU PATIENT PROFILE, ADULT - INTERNATIONAL TRAVEL
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  F/u blood cultures 2/14 are negative  New L LE DVT noted (IVC filter already in place)  leukocytosis-slowly moderating, no fevers today  Suggest:  Continue Vanco 1 g q 48, f/u level  anticipate 4-6 wk course via PICC  Follow temps and CBC/diff   Local wound care  if fever or leukocytosis or breakthrough bacteremia, may need to switch to Daptomycin  overall prognosis seems poor  nutritional support No

## 2021-06-18 NOTE — ASU DISCHARGE PLAN (ADULT/PEDIATRIC) - CARE PROVIDER_API CALL
Rin Kelly)  Surgery  [address] [place] [address]  Phone: [phone]  Fax: [phone]  Follow Up Time: 2 weeks   Rin Kelly)  Surgery  [address] [place] [address]  Phone: [phone]  Fax: [phone]  Follow Up Time: 2 weeks    Aurelio Robb)  ColonRectal Surgery; Plastic Surgery; Surgery; Surgery of the Hand  [address] [place] [address], [facility] [address]  [place] [address]  Phone: [phone]  Fax: [phone]  Follow Up Time: 1 week

## 2021-06-18 NOTE — ASU DISCHARGE PLAN (ADULT/PEDIATRIC) - PROVIDER TOKENS
PROVIDER:[TOKEN:[[id number]],FOLLOWUP:[2 weeks]] PROVIDER:[TOKEN:[[id number]],FOLLOWUP:[2 weeks]],PROVIDER:[TOKEN:[[id number]],FOLLOWUP:[1 week]]

## 2021-06-18 NOTE — ASU DISCHARGE PLAN (ADULT/PEDIATRIC) - NURSING INSTRUCTIONS
You were given Tylenol during your visit, the next dose of Tylenol will be on or after _____4:45 PM______ ,today/tonight and every 6 hours afterwards for pain management, do not take any Tylenol containing products until this time. Do not exceed more than 4000mg of Tylenol in one 24 hour setting. If no contraindications, you may alternate with Ibuprofen or Naproxen 3 hours after dose of Tylenol. Ibuprofen can be taken every 6 hours. Naproxen may be taken every 12 hours.

## 2021-06-18 NOTE — ASU DISCHARGE PLAN (ADULT/PEDIATRIC) - ASU DC SPECIAL INSTRUCTIONSFT
WOUND CARE:   BATHING: Please do not submerge wound underwater. You may shower and/or sponge bathe.  ACTIVITY: No heavy lifting or straining. Otherwise, you may return to your usual level of physical activity. If you are taking narcotic pain medication (such as oxycodone) do NOT drive a car, operate machinery or make important decisions.  DIET: Return to your usual diet.  PAIN: You may take Tylenol and ibuprofen around the clock for pain.  You can alternate between the Tylenol and ibuprofen so that you are taking something for pain every 3 hours. Be sure to not exceed the maximum dosage for these medications as listed on the label. You may take the narcotic pain medication you have been prescribed for breakthrough pain when the Tylenol and ibuprofen are not taking care of your pain.  NOTIFY YOUR SURGEON IF: You have any bleeding that does not stop, any pus draining from your wound, any fever (over 100.4 F) or chills, persistent nausea/vomiting, persistent diarrhea, or if your pain is not controlled on your discharge pain medications.  FOLLOW-UP:  1. Please call to make a follow-up appointment within 2 weeks of discharge with Dr. Kelly. WOUND CARE:   NOTIFY YOUR SURGEON IF: You have any bleeding that does not stop, any pus draining from your wound, any fever (over 100.4 F) or chills, persistent nausea/vomiting, persistent diarrhea, or if your pain is not controlled on your discharge pain medications.  FOLLOW-UP:  1. Please call to make a follow-up appointment within 2 weeks of discharge with Dr. Kelly. WOUND CARE:   1. No showering. Sponge bathe only. Keep bra and all dressings on until seen in the office by Dr. Robb.   2. No strenuous activity. No raising arms overhead, pushing/pulling (including car doors), lifting more than 5 lbs. Walk frequently to prevent blood clots.   3. Antibiotics were prescribed. Take these as a complete course.   4. Take pain medications as needed. Start with over the counter medications and if ineffective then you may take the prescription medications.   5. Resume regular diet.   6. Drain care. Empty drains twice a day and record output. Bring a log of this with you to the clinic so we can determine when to remove the drains.   7. Follow up with Dr. Robb in 1 week.     NOTIFY YOUR SURGEON IF: You have any bleeding that does not stop, any pus draining from your wound, any fever (over 100.4 F) or chills, persistent nausea/vomiting, persistent diarrhea, or if your pain is not controlled on your discharge pain medications.  FOLLOW-UP:  1. Please call to make a follow-up appointment within 2 weeks of discharge with Dr. Kelly.

## 2021-06-23 ENCOUNTER — APPOINTMENT (OUTPATIENT)
Dept: PLASTIC SURGERY | Facility: CLINIC | Age: 51
End: 2021-06-23
Payer: MEDICAID

## 2021-06-23 DIAGNOSIS — Z98.890 OTHER SPECIFIED POSTPROCEDURAL STATES: ICD-10-CM

## 2021-06-23 LAB — SURGICAL PATHOLOGY STUDY: SIGNIFICANT CHANGE UP

## 2021-06-23 PROCEDURE — 99024 POSTOP FOLLOW-UP VISIT: CPT

## 2021-06-29 ENCOUNTER — APPOINTMENT (OUTPATIENT)
Dept: PLASTIC SURGERY | Facility: CLINIC | Age: 51
End: 2021-06-29
Payer: MEDICAID

## 2021-06-29 PROCEDURE — 99024 POSTOP FOLLOW-UP VISIT: CPT

## 2021-07-15 ENCOUNTER — APPOINTMENT (OUTPATIENT)
Dept: PLASTIC SURGERY | Facility: CLINIC | Age: 51
End: 2021-07-15
Payer: MEDICAID

## 2021-07-15 PROCEDURE — 99024 POSTOP FOLLOW-UP VISIT: CPT

## 2021-07-19 PROBLEM — Z87.898 HISTORY OF LUMP OF LEFT BREAST: Status: RESOLVED | Noted: 2021-03-25 | Resolved: 2021-07-19

## 2021-07-20 ENCOUNTER — APPOINTMENT (OUTPATIENT)
Dept: SURGICAL ONCOLOGY | Facility: CLINIC | Age: 51
End: 2021-07-20
Payer: MEDICAID

## 2021-07-20 VITALS
SYSTOLIC BLOOD PRESSURE: 115 MMHG | HEIGHT: 69 IN | DIASTOLIC BLOOD PRESSURE: 78 MMHG | BODY MASS INDEX: 35.4 KG/M2 | HEART RATE: 73 BPM | WEIGHT: 239 LBS

## 2021-07-20 DIAGNOSIS — Z87.898 PERSONAL HISTORY OF OTHER SPECIFIED CONDITIONS: ICD-10-CM

## 2021-07-20 PROCEDURE — 99024 POSTOP FOLLOW-UP VISIT: CPT

## 2021-07-20 NOTE — HISTORY OF PRESENT ILLNESS
[FreeTextEntry1] : The patient is a 50 year old female with B/L breast masses s/p B/L excisional biopsies with reduction mammoplasty 6/18/2021\par \par Previous history:\par The patient reports that she previously had a Left breast mass in 2004 that was aspirated and reported got smaller/resolved, likely a cyst. She then notes feeling a left breast mass around 12/2019 but did not seek medical attention until recently when she thought it had gotten larger.\par \par Prior imaging:\par 10/23/2020 ([facility]) DM showed heterogeneously dense breasts.\par - R UOQ indeterminate microcalcifications were seen, N10\par - L UOQ 5 cm hyperdense mass, N4\par \par 10/23/2020 US\par  - R 3 x 1.6 x 1.2 cm lobulated isoechoic mass 7:00 N3\par  - L 4.7 x 4.7 x 2.0 lobulated complicated cystic isoechoic mass 2:00 N6\par  - LN negative\par \par 2/11/2021 R stereo bx\par  - hyalinized FA with calcifications, rec 6 mo F/U with mammogram\par \par She has not had any prior breast imaging. Today, the patient denies breast pain, skin changes, or nipple discharge.  She does note that she has longstanding back pain from her large (G cup) breasts.\par \par Interval history:\par Pain is well-controlled, only concern currently is a pimple on left nipple and appearance of the incisions. No fevers, chills. Is massaging the incisions with vaseline and bio-oil--painful, but getting better.

## 2021-07-20 NOTE — ASSESSMENT
[FreeTextEntry1] : The patient is a 50 year old female with B/L breast masses s/p excisional biopsy with reduction mammoplasty 6/18/2021, doing well.\par \par Surgical pathology reviewed with the patient.\par \par Plan:\par  - continue postop incision care per plastics surgery\par  - B/L SM and US 10/2021\par  - RTO after imaging

## 2021-07-20 NOTE — CONSULT LETTER
[Dear  ___] : Dear  [unfilled], [Please see my note below.] : Please see my note below. [Consult Closing:] : Thank you very much for allowing me to participate in the care of this patient.  If you have any questions, please do not hesitate to contact me. [Sincerely,] : Sincerely, [FreeTextEntry3] : Rin Kelly MD\par Breast Surgeon\par Division of Surgical Oncology\par Department of Surgery\par [address] [place] [address]\[place] [address] \par Tel: [phone]\par Fax: [phone]\par Email: [email or web address]

## 2021-07-20 NOTE — PHYSICAL EXAM
[Normocephalic] : normocephalic [Atraumatic] : atraumatic [Sclera nonicteric] : sclera nonicteric [None] : no ptosis [No dominant masses] : no dominant masses in right breast  [No dominant masses] : no dominant masses left breast [No Nipple Retraction] : no left nipple retraction [No Nipple Discharge] : no left nipple discharge [de-identified] : non-labored respirations  [de-identified] : Incisions healing well- no erythema, incisions intact, no drainage. Nipples are viable

## 2021-07-29 ENCOUNTER — APPOINTMENT (OUTPATIENT)
Dept: PLASTIC SURGERY | Facility: CLINIC | Age: 51
End: 2021-07-29
Payer: MEDICAID

## 2021-07-29 VITALS
HEIGHT: 69 IN | HEART RATE: 80 BPM | TEMPERATURE: 98 F | DIASTOLIC BLOOD PRESSURE: 85 MMHG | BODY MASS INDEX: 35.4 KG/M2 | WEIGHT: 239 LBS | OXYGEN SATURATION: 98 % | SYSTOLIC BLOOD PRESSURE: 144 MMHG

## 2021-07-29 PROCEDURE — 99024 POSTOP FOLLOW-UP VISIT: CPT

## 2021-08-17 ENCOUNTER — APPOINTMENT (OUTPATIENT)
Dept: PLASTIC SURGERY | Facility: CLINIC | Age: 51
End: 2021-08-17
Payer: MEDICAID

## 2021-08-17 VITALS
HEART RATE: 63 BPM | SYSTOLIC BLOOD PRESSURE: 156 MMHG | BODY MASS INDEX: 35.4 KG/M2 | TEMPERATURE: 97.9 F | HEIGHT: 69 IN | OXYGEN SATURATION: 97 % | DIASTOLIC BLOOD PRESSURE: 87 MMHG | WEIGHT: 239 LBS

## 2021-08-17 DIAGNOSIS — N62 HYPERTROPHY OF BREAST: ICD-10-CM

## 2021-08-17 PROCEDURE — 99024 POSTOP FOLLOW-UP VISIT: CPT

## 2021-09-28 ENCOUNTER — APPOINTMENT (OUTPATIENT)
Dept: PLASTIC SURGERY | Facility: CLINIC | Age: 51
End: 2021-09-28
Payer: MEDICAID

## 2021-09-28 VITALS — HEIGHT: 69 IN | TEMPERATURE: 97.6 F | BODY MASS INDEX: 37.03 KG/M2 | WEIGHT: 250 LBS

## 2021-09-28 PROCEDURE — 99212 OFFICE O/P EST SF 10 MIN: CPT

## 2021-10-26 ENCOUNTER — APPOINTMENT (OUTPATIENT)
Dept: PLASTIC SURGERY | Facility: CLINIC | Age: 51
End: 2021-10-26
Payer: MEDICAID

## 2021-10-26 VITALS
HEART RATE: 79 BPM | HEIGHT: 69 IN | TEMPERATURE: 98.1 F | OXYGEN SATURATION: 97 % | SYSTOLIC BLOOD PRESSURE: 141 MMHG | BODY MASS INDEX: 36.43 KG/M2 | WEIGHT: 246 LBS | DIASTOLIC BLOOD PRESSURE: 85 MMHG

## 2021-10-26 DIAGNOSIS — R92.2 INCONCLUSIVE MAMMOGRAM: ICD-10-CM

## 2021-10-26 PROCEDURE — 99211 OFF/OP EST MAY X REQ PHY/QHP: CPT

## 2021-10-27 PROBLEM — R92.2 DENSE BREASTS: Status: ACTIVE | Noted: 2021-07-20

## 2021-11-02 ENCOUNTER — APPOINTMENT (OUTPATIENT)
Dept: SURGICAL ONCOLOGY | Facility: CLINIC | Age: 51
End: 2021-11-02
Payer: MEDICAID

## 2021-11-02 VITALS
OXYGEN SATURATION: 98 % | WEIGHT: 246 LBS | HEIGHT: 69 IN | BODY MASS INDEX: 36.43 KG/M2 | HEART RATE: 77 BPM | DIASTOLIC BLOOD PRESSURE: 86 MMHG | SYSTOLIC BLOOD PRESSURE: 122 MMHG

## 2021-11-02 VITALS — TEMPERATURE: 96.6 F

## 2021-11-02 PROCEDURE — 99212 OFFICE O/P EST SF 10 MIN: CPT

## 2021-11-02 NOTE — CONSULT LETTER
[Dear  ___] : Dear  [unfilled], [Courtesy Letter:] : I had the pleasure of seeing your patient, [unfilled], in my office today. [Please see my note below.] : Please see my note below. [Consult Closing:] : Thank you very much for allowing me to participate in the care of this patient.  If you have any questions, please do not hesitate to contact me. [Sincerely,] : Sincerely, [FreeTextEntry3] : Rin Kelly MD\par Breast Surgeon\par Division of Surgical Oncology\par Department of Surgery\par [address] [place] [address]\[place] [address] \par Tel: [phone]\par Fax: [phone]\par Email: [email or web address]

## 2021-11-02 NOTE — ASSESSMENT
[FreeTextEntry1] : The patient is a 50 year old female with B/L breast masses s/p excisional biopsy with reduction mammoplasty 6/18/2021, doing well.\par \par Recent screening mammo and US 10/22/2021 reviewed with patient- BR2.\par \par Plan:\par  - continue postop incision care per plastics surgery\par  - Next screening 10/2022 B/L SM\par  - RTO after imaging. \par

## 2021-11-02 NOTE — HISTORY OF PRESENT ILLNESS
[FreeTextEntry1] : The patient is a 50 year old female with B/L breast masses s/p B/L excisional biopsies with reduction mammoplasty 6/18/2021\par \par Previous history:\par The patient reports that she previously had a Left breast mass in 2004 that was aspirated and reported got smaller/resolved, likely a cyst. She then notes feeling a left breast mass around 12/2019 but did not seek medical attention until recently when she thought it had gotten larger.\par \par Prior imaging:\par 10/23/2020 ([facility]) DM showed heterogeneously dense breasts.\par - R UOQ indeterminate microcalcifications were seen, N10\par - L UOQ 5 cm hyperdense mass, N4\par \par 10/23/2020 US\par  - R 3 x 1.6 x 1.2 cm lobulated isoechoic mass 7:00 N3\par  - L 4.7 x 4.7 x 2.0 lobulated complicated cystic isoechoic mass 2:00 N6\par  - LN negative\par \par 2/11/2021 R stereo bx\par  - hyalinized FA with calcifications, rec 6 mo F/U with mammogram\par \par She has not had any prior breast imaging. Today, the patient denies breast pain, skin changes, or nipple discharge.  She does note that she has longstanding back pain from her large (G cup) breasts.\par \par 7/20/2021 office visit: Pain is well-controlled, only concern currently is a pimple on left nipple and appearance of the incisions. No fevers, chills. Is massaging the incisions with vaseline and bio-oil--painful, but getting better.\par \par 10/22/2021 B/L SM ([facility]) scattered fibroglandular densities \par  - Few scattered benign punctate calcs \par  - No suspicious mass, architectural distortion, or clustered pleomorphic microcalcifications\par \par 10/22/2021 B/L US\par  - Negative. BR2\par \par Interval history:\par The patient is healing well from the breast reduction. She continues to massage the incisions with cocoa butter. Back pain is much improved. Denies any breast symptoms. Today, the patient reports no breast complaints. Denies pain, masses, skin changes, or nipple discharge.

## 2021-11-02 NOTE — PHYSICAL EXAM
[Normocephalic] : normocephalic [Atraumatic] : atraumatic [PERRL] : pupils equal, round and reactive to light [Sclera nonicteric] : sclera nonicteric [Supple] : supple [No Supraclavicular Adenopathy] : no supraclavicular adenopathy [No Cervical Adenopathy] : no cervical adenopathy [No Thyromegaly] : no thyromegaly [Examined in the supine and seated position] : examined in the supine and seated position [Symmetrical] : symmetrical [None] : no ptosis [No dominant masses] : no dominant masses in right breast  [No dominant masses] : no dominant masses left breast [No Nipple Retraction] : no left nipple retraction [No Nipple Discharge] : no left nipple discharge [Breast Nipple Inversion] : nipples not inverted [Breast Nipple Retraction] : nipples not retracted [Breast Nipple Flattening] : nipples not flattened [Breast Nipple Fissures] : nipples not fissured [Breast Abnormal Lactation (Galactorrhea)] : no galactorrhea [Breast Abnormal Secretion Bloody Fluid] : no bloody discharge [Breast Abnormal Secretion Serous Fluid] : no serous discharge [Breast Abnormal Secretion Opalescent Fluid] : no milky discharge [No Axillary Lymphadenopathy] : no left axillary lymphadenopathy [No Edema] : no edema [No Swelling] : no swelling [Full ROM] : full range of motion [de-identified] : non-labored respirations  [de-identified] : well-healed incisions B/L

## 2021-11-12 PROBLEM — N62 MACROMASTIA: Status: ACTIVE | Noted: 2021-04-15

## 2021-12-08 PROBLEM — Z98.890 POST-OPERATIVE STATE: Status: ACTIVE | Noted: 2021-11-12

## 2022-05-27 ENCOUNTER — OUTPATIENT (OUTPATIENT)
Dept: OUTPATIENT SERVICES | Facility: HOSPITAL | Age: 52
LOS: 1 days | End: 2022-05-27
Payer: MEDICAID

## 2022-05-27 VITALS
DIASTOLIC BLOOD PRESSURE: 83 MMHG | OXYGEN SATURATION: 98 % | HEIGHT: 69 IN | HEART RATE: 74 BPM | TEMPERATURE: 98 F | RESPIRATION RATE: 14 BRPM | WEIGHT: 248.9 LBS | SYSTOLIC BLOOD PRESSURE: 161 MMHG

## 2022-05-27 DIAGNOSIS — Z98.890 OTHER SPECIFIED POSTPROCEDURAL STATES: Chronic | ICD-10-CM

## 2022-05-27 DIAGNOSIS — Z01.818 ENCOUNTER FOR OTHER PREPROCEDURAL EXAMINATION: ICD-10-CM

## 2022-05-27 DIAGNOSIS — N93.9 ABNORMAL UTERINE AND VAGINAL BLEEDING, UNSPECIFIED: ICD-10-CM

## 2022-05-27 LAB
A1C WITH ESTIMATED AVERAGE GLUCOSE RESULT: 5.8 % — HIGH (ref 4–5.6)
ALBUMIN SERPL ELPH-MCNC: 3.8 G/DL — SIGNIFICANT CHANGE UP (ref 3.3–5)
ALP SERPL-CCNC: 80 U/L — SIGNIFICANT CHANGE UP (ref 40–120)
ALT FLD-CCNC: 30 U/L — SIGNIFICANT CHANGE UP (ref 12–78)
ANION GAP SERPL CALC-SCNC: 5 MMOL/L — SIGNIFICANT CHANGE UP (ref 5–17)
AST SERPL-CCNC: 18 U/L — SIGNIFICANT CHANGE UP (ref 15–37)
BILIRUB SERPL-MCNC: 0.3 MG/DL — SIGNIFICANT CHANGE UP (ref 0.2–1.2)
BUN SERPL-MCNC: 9 MG/DL — SIGNIFICANT CHANGE UP (ref 7–23)
CALCIUM SERPL-MCNC: 8.9 MG/DL — SIGNIFICANT CHANGE UP (ref 8.5–10.1)
CHLORIDE SERPL-SCNC: 107 MMOL/L — SIGNIFICANT CHANGE UP (ref 96–108)
CO2 SERPL-SCNC: 27 MMOL/L — SIGNIFICANT CHANGE UP (ref 22–31)
CREAT SERPL-MCNC: 0.56 MG/DL — SIGNIFICANT CHANGE UP (ref 0.5–1.3)
EGFR: 112 ML/MIN/1.73M2 — SIGNIFICANT CHANGE UP
ESTIMATED AVERAGE GLUCOSE: 120 MG/DL — HIGH (ref 68–114)
GLUCOSE SERPL-MCNC: 124 MG/DL — HIGH (ref 70–99)
HCG UR QL: NEGATIVE — SIGNIFICANT CHANGE UP
HCT VFR BLD CALC: 36.3 % — SIGNIFICANT CHANGE UP (ref 34.5–45)
HGB BLD-MCNC: 11.3 G/DL — LOW (ref 11.5–15.5)
MCHC RBC-ENTMCNC: 26.2 PG — LOW (ref 27–34)
MCHC RBC-ENTMCNC: 31.1 GM/DL — LOW (ref 32–36)
MCV RBC AUTO: 84 FL — SIGNIFICANT CHANGE UP (ref 80–100)
NRBC # BLD: 0 /100 WBCS — SIGNIFICANT CHANGE UP (ref 0–0)
PLATELET # BLD AUTO: 357 K/UL — SIGNIFICANT CHANGE UP (ref 150–400)
POTASSIUM SERPL-MCNC: 4.1 MMOL/L — SIGNIFICANT CHANGE UP (ref 3.5–5.3)
POTASSIUM SERPL-SCNC: 4.1 MMOL/L — SIGNIFICANT CHANGE UP (ref 3.5–5.3)
PROT SERPL-MCNC: 7.5 G/DL — SIGNIFICANT CHANGE UP (ref 6–8.3)
RBC # BLD: 4.32 M/UL — SIGNIFICANT CHANGE UP (ref 3.8–5.2)
RBC # FLD: 24.3 % — HIGH (ref 10.3–14.5)
SODIUM SERPL-SCNC: 139 MMOL/L — SIGNIFICANT CHANGE UP (ref 135–145)
WBC # BLD: 8.35 K/UL — SIGNIFICANT CHANGE UP (ref 3.8–10.5)
WBC # FLD AUTO: 8.35 K/UL — SIGNIFICANT CHANGE UP (ref 3.8–10.5)

## 2022-05-27 PROCEDURE — 36415 COLL VENOUS BLD VENIPUNCTURE: CPT

## 2022-05-27 PROCEDURE — 81025 URINE PREGNANCY TEST: CPT

## 2022-05-27 PROCEDURE — 86900 BLOOD TYPING SEROLOGIC ABO: CPT

## 2022-05-27 PROCEDURE — 86901 BLOOD TYPING SEROLOGIC RH(D): CPT

## 2022-05-27 PROCEDURE — 80053 COMPREHEN METABOLIC PANEL: CPT

## 2022-05-27 PROCEDURE — G0463: CPT

## 2022-05-27 PROCEDURE — 85027 COMPLETE CBC AUTOMATED: CPT

## 2022-05-27 PROCEDURE — 83036 HEMOGLOBIN GLYCOSYLATED A1C: CPT

## 2022-05-27 PROCEDURE — 86850 RBC ANTIBODY SCREEN: CPT

## 2022-05-27 RX ORDER — METFORMIN HYDROCHLORIDE 850 MG/1
0 TABLET ORAL
Qty: 0 | Refills: 0 | DISCHARGE

## 2022-05-27 NOTE — H&P PST ADULT - PROBLEM SELECTOR PLAN 1
PST Labs; CBC,  CMP, HgB A1C, Type & Screen, UcG. EKG on chart from Cardiologist. Copy sent to [facility] for review. Cardiac clearance on chart. Pt was initially seen and cleared for procedure by PCP April 2022. Pt notes however she is going back again on 5/30/22 for another medical clearance. Pt does NOT see Endocrine for management of her Diabetes. Notes last A1C 6.5. Notes morning blood glucoses are low 100's. Pt instructed to HOLD Metformin day prior to procedure as well as morning of procedure. She will also HOLD her Alogliptin as well. She may take her Lisinopril and her Metoprolol with small sip of water morning of procedure. Pre-op instructions as well as pre-op wash instructions given to pt with understanding verbalized. made pt COVID-19 Swab appointment at [facility]. Provided pt with RX for COVID swab. pt verbalizes understanding of all instructions discussed today in PST. All questions addressed with pt prior to her leaving the PST department.

## 2022-05-27 NOTE — H&P PST ADULT - HISTORY OF PRESENT ILLNESS
51 year old female  (missed AB x2) PMH HTN, Type 2 DM, Iron Deficiency Anemia; now with Abnormal uterine and Vaginal Bleeding Unspecified; presents for PST prior to Total Abdominal Hysterectomy W/On-Q pain pump with Dr Darlene Guan on 6/10/2022. Pt notes she has been bleeding heavy with her menses and having heavy cramping for several years. Also notes H/O Fibroids and feels it has gotten worse over the past several years. Sees hematologist for Iron Infusions. Following discussions with Dr Guan regarding treatment options pt is electing for scheduled procedure.

## 2022-05-27 NOTE — H&P PST ADULT - NSICDXPASTMEDICALHX_GEN_ALL_CORE_FT
PAST MEDICAL HISTORY:  Abnormal uterine and vaginal bleeding, unspecified     Hypertension     Iron deficiency anemia due to chronic blood loss     Missed  X 2    Type 2 diabetes mellitus

## 2022-05-27 NOTE — H&P PST ADULT - VISION (WITH CORRECTIVE LENSES IF THE PATIENT USUALLY WEARS THEM):
has RX for Eyeglasses has not gotten them yet/Partially impaired: cannot see medication labels or newsprint, but can see obstacles in path, and the surrounding layout; can count fingers at arm's length

## 2022-05-27 NOTE — H&P PST ADULT - FALL HARM RISK - UNIVERSAL INTERVENTIONS
Bed in lowest position, wheels locked, appropriate side rails in place/Call bell, personal items and telephone in reach/Instruct patient to call for assistance before getting out of bed or chair/Non-slip footwear when patient is out of bed/[place] to call system/Physically safe environment - no spills, clutter or unnecessary equipment/Purposeful Proactive Rounding/Room/bathroom lighting operational, light cord in reach

## 2022-05-27 NOTE — H&P PST ADULT - NEGATIVE ENMT SYMPTOMS
no hearing difficulty/no vertigo/no sinus symptoms/no nasal congestion/no abnormal taste sensation/no dry mouth/no throat pain/no dysphagia

## 2022-05-27 NOTE — H&P PST ADULT - ASSESSMENT
51 year old female  (missed AB x2) PMH HTN, Type 2 DM, Iron Deficiency Anemia; now with Abnormal uterine and Vaginal Bleeding Unspecified; scheduled for Total Abdominal Hysterectomy W/On-Q pain pump with Dr Darlene Guan on 6/10/2022.

## 2022-05-27 NOTE — H&P PST ADULT - NSICDXPASTSURGICALHX_GEN_ALL_CORE_FT
PAST SURGICAL HISTORY:  H/O colonoscopy     H/O endoscopy     H/O lumpectomy LEFT Breast    History of D&C     S/P bilateral breast reduction

## 2022-05-27 NOTE — H&P PST ADULT - PRO ANTICIPATED DISCH DISP
- Pt reports hx of b/l peripheral neuropathy likely 2/2 to diabetes  - on gabapentin 600 mg TID home

## 2022-05-31 PROBLEM — E11.9 TYPE 2 DIABETES MELLITUS WITHOUT COMPLICATIONS: Chronic | Status: ACTIVE | Noted: 2022-05-27

## 2022-05-31 PROBLEM — N93.9 ABNORMAL UTERINE AND VAGINAL BLEEDING, UNSPECIFIED: Chronic | Status: ACTIVE | Noted: 2022-05-27

## 2022-05-31 PROBLEM — O02.1 MISSED ABORTION: Chronic | Status: ACTIVE | Noted: 2022-05-27

## 2022-06-09 ENCOUNTER — TRANSCRIPTION ENCOUNTER (OUTPATIENT)
Age: 52
End: 2022-06-09

## 2022-06-09 NOTE — ASU PATIENT PROFILE, ADULT - NSSUBSTANCEUSE_GEN_ALL_CORE_SD
oz (85.9 kg)   BMI 31.94 kg/m²      Physical Exam:   General: Normal gait and posture. Hygiene appears normal.  No evidence of overt muscle wasting. Eye: inspection of the conjunctiva and eyelids reveals no evidence of abnormal injection or discharge. Examination of the pupils shows that they react equally and consensually to light and to accommodation. The irises appear normal.  Fundi are grossly benign. Ears, nose, mouth, throat: external inspection of the ears and nose reveals no evidence of asymmetry mass or other abnormality. Otoscopic examination of the external auditory canals and tympanic membranes reveals no obstruction mass or other abnormal finding. The tympanic membranes are clear without evidence of injection. Visualized structures within the middle ear are normal.  There are no mucosal sores. Neck: examination of the neck reveals no evidence of mass asymmetry or crepitus. The trachea is midline. Examination of the thyroid shows no evidence of enlargement tenderness or mass. Respiratory: the patient's respiratory effort shows normal respiration without evidence of the use of accessory muscles. Diaphragmatic movement is normal.  There is no evidence of intercostal retractions. Percussion of the chest revealed no evidence of dullness flatness or hyperresonance. Auscultation revealed normal breath sounds in all lung fields. They worsen no evidence of abnormal sounds such as rales or wheezes. There was no evidence of a friction rub. Cardiovascular: palpation of the patient's chest revealed no evidence of abnormal thrill. Point of maximal impulse showed no displacement. Auscultation of the heart revealed no evidence of murmur gallop or other abnormal sound. Examination of the carotid arteries revealed no evidence of bruit, abnormal amplitude or abnormal pulsation. Gastrointestinal: the examination of the patient's abdomen showed no evidence of mass or tenderness.   The liver and spleen Denies Illicit Drug use/caffeine

## 2022-06-10 ENCOUNTER — OUTPATIENT (OUTPATIENT)
Dept: OUTPATIENT SERVICES | Facility: HOSPITAL | Age: 52
LOS: 1 days | End: 2022-06-10
Payer: MEDICAID

## 2022-06-10 ENCOUNTER — TRANSCRIPTION ENCOUNTER (OUTPATIENT)
Age: 52
End: 2022-06-10

## 2022-06-10 ENCOUNTER — RESULT REVIEW (OUTPATIENT)
Age: 52
End: 2022-06-10

## 2022-06-10 VITALS
WEIGHT: 246.92 LBS | TEMPERATURE: 98 F | SYSTOLIC BLOOD PRESSURE: 179 MMHG | DIASTOLIC BLOOD PRESSURE: 89 MMHG | HEART RATE: 62 BPM | HEIGHT: 69 IN | RESPIRATION RATE: 16 BRPM | OXYGEN SATURATION: 99 %

## 2022-06-10 VITALS
HEART RATE: 78 BPM | SYSTOLIC BLOOD PRESSURE: 145 MMHG | RESPIRATION RATE: 17 BRPM | DIASTOLIC BLOOD PRESSURE: 75 MMHG | OXYGEN SATURATION: 95 %

## 2022-06-10 DIAGNOSIS — Z98.890 OTHER SPECIFIED POSTPROCEDURAL STATES: Chronic | ICD-10-CM

## 2022-06-10 DIAGNOSIS — N93.9 ABNORMAL UTERINE AND VAGINAL BLEEDING, UNSPECIFIED: ICD-10-CM

## 2022-06-10 LAB
BLD GP AB SCN SERPL QL: SIGNIFICANT CHANGE UP
HCG UR QL: NEGATIVE — SIGNIFICANT CHANGE UP
HCT VFR BLD CALC: 41.3 % — SIGNIFICANT CHANGE UP (ref 34.5–45)
HGB BLD-MCNC: 13.4 G/DL — SIGNIFICANT CHANGE UP (ref 11.5–15.5)
MCHC RBC-ENTMCNC: 27.6 PG — SIGNIFICANT CHANGE UP (ref 27–34)
MCHC RBC-ENTMCNC: 32.4 GM/DL — SIGNIFICANT CHANGE UP (ref 32–36)
MCV RBC AUTO: 85.2 FL — SIGNIFICANT CHANGE UP (ref 80–100)
NRBC # BLD: 0 /100 WBCS — SIGNIFICANT CHANGE UP (ref 0–0)
PLATELET # BLD AUTO: 282 K/UL — SIGNIFICANT CHANGE UP (ref 150–400)
RBC # BLD: 4.85 M/UL — SIGNIFICANT CHANGE UP (ref 3.8–5.2)
RBC # FLD: SIGNIFICANT CHANGE UP (ref 10.3–14.5)
WBC # BLD: 12.04 K/UL — HIGH (ref 3.8–10.5)
WBC # FLD AUTO: 12.04 K/UL — HIGH (ref 3.8–10.5)

## 2022-06-10 PROCEDURE — 88307 TISSUE EXAM BY PATHOLOGIST: CPT

## 2022-06-10 PROCEDURE — 88307 TISSUE EXAM BY PATHOLOGIST: CPT | Mod: 26

## 2022-06-10 PROCEDURE — 88305 TISSUE EXAM BY PATHOLOGIST: CPT

## 2022-06-10 PROCEDURE — 88305 TISSUE EXAM BY PATHOLOGIST: CPT | Mod: 26

## 2022-06-10 PROCEDURE — C1889: CPT

## 2022-06-10 PROCEDURE — 82962 GLUCOSE BLOOD TEST: CPT

## 2022-06-10 PROCEDURE — 86901 BLOOD TYPING SEROLOGIC RH(D): CPT

## 2022-06-10 PROCEDURE — 81025 URINE PREGNANCY TEST: CPT

## 2022-06-10 PROCEDURE — 58573 TLH W/T/O UTERUS OVER 250 G: CPT

## 2022-06-10 PROCEDURE — 36415 COLL VENOUS BLD VENIPUNCTURE: CPT

## 2022-06-10 PROCEDURE — 85027 COMPLETE CBC AUTOMATED: CPT

## 2022-06-10 PROCEDURE — 86850 RBC ANTIBODY SCREEN: CPT

## 2022-06-10 PROCEDURE — 86900 BLOOD TYPING SEROLOGIC ABO: CPT

## 2022-06-10 DEVICE — VISTASEAL FIBRIN HUMAN 10ML: Type: IMPLANTABLE DEVICE | Status: FUNCTIONAL

## 2022-06-10 RX ORDER — HYDROMORPHONE HYDROCHLORIDE 2 MG/ML
0.5 INJECTION INTRAMUSCULAR; INTRAVENOUS; SUBCUTANEOUS
Refills: 0 | Status: DISCONTINUED | OUTPATIENT
Start: 2022-06-10 | End: 2022-06-10

## 2022-06-10 RX ORDER — ACETAMINOPHEN 500 MG
1000 TABLET ORAL ONCE
Refills: 0 | Status: COMPLETED | OUTPATIENT
Start: 2022-06-10 | End: 2022-06-10

## 2022-06-10 RX ORDER — ALOGLIPTIN 12.5 MG/1
1 TABLET, FILM COATED ORAL
Qty: 0 | Refills: 0 | DISCHARGE

## 2022-06-10 RX ORDER — SODIUM CHLORIDE 9 MG/ML
1000 INJECTION, SOLUTION INTRAVENOUS
Refills: 0 | Status: DISCONTINUED | OUTPATIENT
Start: 2022-06-10 | End: 2022-06-10

## 2022-06-10 RX ORDER — METRONIDAZOLE 500 MG
500 TABLET ORAL ONCE
Refills: 0 | Status: COMPLETED | OUTPATIENT
Start: 2022-06-10 | End: 2022-06-10

## 2022-06-10 RX ORDER — ATORVASTATIN CALCIUM 80 MG/1
1 TABLET, FILM COATED ORAL
Qty: 0 | Refills: 0 | DISCHARGE

## 2022-06-10 RX ORDER — ONDANSETRON 8 MG/1
4 TABLET, FILM COATED ORAL ONCE
Refills: 0 | Status: DISCONTINUED | OUTPATIENT
Start: 2022-06-10 | End: 2022-06-10

## 2022-06-10 RX ORDER — CEFAZOLIN SODIUM 1 G
2000 VIAL (EA) INJECTION ONCE
Refills: 0 | Status: COMPLETED | OUTPATIENT
Start: 2022-06-10 | End: 2022-06-10

## 2022-06-10 RX ORDER — METOPROLOL TARTRATE 50 MG
1 TABLET ORAL
Qty: 0 | Refills: 0 | DISCHARGE

## 2022-06-10 RX ORDER — SODIUM CHLORIDE 9 MG/ML
1000 INJECTION, SOLUTION INTRAVENOUS
Refills: 0 | Status: DISCONTINUED | OUTPATIENT
Start: 2022-06-10 | End: 2022-06-24

## 2022-06-10 RX ORDER — OXYCODONE HYDROCHLORIDE 5 MG/1
5 TABLET ORAL ONCE
Refills: 0 | Status: DISCONTINUED | OUTPATIENT
Start: 2022-06-10 | End: 2022-06-10

## 2022-06-10 RX ORDER — IBUPROFEN 200 MG
3 TABLET ORAL
Qty: 0 | Refills: 0 | DISCHARGE

## 2022-06-10 RX ORDER — KETOROLAC TROMETHAMINE 30 MG/ML
30 SYRINGE (ML) INJECTION ONCE
Refills: 0 | Status: DISCONTINUED | OUTPATIENT
Start: 2022-06-10 | End: 2022-06-10

## 2022-06-10 RX ORDER — ASPIRIN/CALCIUM CARB/MAGNESIUM 324 MG
1 TABLET ORAL
Qty: 0 | Refills: 0 | DISCHARGE

## 2022-06-10 RX ORDER — METFORMIN HYDROCHLORIDE 850 MG/1
1 TABLET ORAL
Qty: 0 | Refills: 0 | DISCHARGE

## 2022-06-10 RX ORDER — FERROUS SULFATE 325(65) MG
0 TABLET ORAL
Qty: 0 | Refills: 0 | DISCHARGE

## 2022-06-10 RX ADMIN — Medication 30 MILLIGRAM(S): at 16:29

## 2022-06-10 RX ADMIN — HYDROMORPHONE HYDROCHLORIDE 0.5 MILLIGRAM(S): 2 INJECTION INTRAMUSCULAR; INTRAVENOUS; SUBCUTANEOUS at 12:39

## 2022-06-10 RX ADMIN — HYDROMORPHONE HYDROCHLORIDE 0.5 MILLIGRAM(S): 2 INJECTION INTRAMUSCULAR; INTRAVENOUS; SUBCUTANEOUS at 16:29

## 2022-06-10 RX ADMIN — HYDROMORPHONE HYDROCHLORIDE 0.5 MILLIGRAM(S): 2 INJECTION INTRAMUSCULAR; INTRAVENOUS; SUBCUTANEOUS at 12:23

## 2022-06-10 RX ADMIN — Medication 30 MILLIGRAM(S): at 13:44

## 2022-06-10 RX ADMIN — SODIUM CHLORIDE 75 MILLILITER(S): 9 INJECTION, SOLUTION INTRAVENOUS at 12:40

## 2022-06-10 RX ADMIN — SODIUM CHLORIDE 75 MILLILITER(S): 9 INJECTION, SOLUTION INTRAVENOUS at 07:11

## 2022-06-10 RX ADMIN — HYDROMORPHONE HYDROCHLORIDE 0.5 MILLIGRAM(S): 2 INJECTION INTRAMUSCULAR; INTRAVENOUS; SUBCUTANEOUS at 13:18

## 2022-06-10 RX ADMIN — HYDROMORPHONE HYDROCHLORIDE 0.5 MILLIGRAM(S): 2 INJECTION INTRAMUSCULAR; INTRAVENOUS; SUBCUTANEOUS at 12:29

## 2022-06-10 NOTE — BRIEF OPERATIVE NOTE - OPERATION/FINDINGS
uterus 20 week size. Uneventful TLH, bilateral salpingectomy. Intact bladder with strong ureteral jets at cystoscopy at case conclusion. EBL = 100 cc.

## 2022-06-10 NOTE — BRIEF OPERATIVE NOTE - NSICDXBRIEFPREOP_GEN_ALL_CORE_FT
PRE-OP DIAGNOSIS:  Menorrhagia 10-Herb-2022 11:58:48  Emilio Malone  Adenomyosis 10-Herb-2022 11:58:41  Emilio Malone

## 2022-06-10 NOTE — ASU DISCHARGE PLAN (ADULT/PEDIATRIC) - ASU DC SPECIAL INSTRUCTIONSFT
No intercourse/douching/tampons for 6 weeks. Avoid strenuous activity, exercise, heavy lifting for two weeks. You may shower freely, but do not soak in the tub or swim. Eat according to your appetite. Please follow all written and verbal instructions, and call us if you are having any acute problems, including (but not limited to) excessive vaginal bleeding (soaking pads), fever >100 degrees, persistent nausea or vomiting, or failure to improve over time

## 2022-06-10 NOTE — ASU DISCHARGE PLAN (ADULT/PEDIATRIC) - BRAND OF FIRST COVID-19 BOOSTER
Patient: Mirtha Sanderson   MRN: [id number]  YOB: 1969    HEME/ONC HISTORY:  Patient presented to the ED 12/14/20 with throat pain/swelling, chest pain, and fever to 102.1.  She has been unable to eat due to mouth and throat pain.  Upon further exam, she was noted to have a very elevated WBC with low platelets; she was transferred to [facility] with suspicion of leukemia for further workup and treatment.    REASON FOR ADMISSION: Leukocytosis, thrombocytopenia with concern for leukemia    CHEMO REGIMEN: 7+3 + Midostaurin   Oral Chemotherapy: Yes  Is patient on oral chemotherapy?  YES, Discussed oral chemotherapy adherence and side effects with patient.  Patient is taking medication as prescribed.    INTERVAL HISTORY:  Patient tearful this morning after brushing her hair and having large amounts fall out. Nursing staff offered to shave it, patient did not want to do that at this time but will notify staff if she changes her mind. Continues to drink 2 ensures per day. Reports improvement in mouth pain and swelling. Epistaxis early this morning with platelet count of 18, plan to transfuse to keep greater than 20. Remains afebrile. Edema, blood pressures, and weight improved with diuresis and IVF changes yesterday. Six loose stools yesterday, cdiff ordered, awaiting sample.       ALLERGIES:  Acetaminophen, Fish allergy   (food or med), Shellfish allergy   (food or med), Latex, and Latex   (environmental)      CODE STATUS: FULL    REVIEW OF SYSTEMS:  All systems were reviewed including Constitutional, HEENT, Endocrine, Hematologic, Lymphatic, Respiratory, Cardiovascular, Gastrointestinal, Genitourinary, Musculoskeletal, Integumentary, Neurologic, Psychiatric and are negative other than as detailed in HPI or above.     Vital Last Value 24 Hour Range   Temperature 98.4 °F (36.9 °C) Temp  Min: 98.2 °F (36.8 °C)  Max: 98.9 °F (37.2 °C)   Pulse 73 Pulse  Min: 71  Max: 97   Respiratory 18 Resp  Min: 16  Max: 18    Non-Invasive  Blood Pressure 134/68 (12/31/20 1056) BP  Min: 134/68  Max: 170/92   Pulse Oximetry 92 % SpO2  Min: 91 %  Max: 94 %     Vital Today Admitted   Weight 58.6 kg Weight: 60.6 kg   Height N/A Height: 5' 2\" (157.5 cm)     Lines: Right basilic double lumen PICC line    Intake/Output:    Last Stool Occurrence: 1 (12/30/20 2218)      Intake/Output Summary (Last 24 hours) at 12/31/2020 1140  Last data filed at 12/31/2020 0640  Gross per 24 hour   Intake 2579.72 ml   Output 2250 ml   Net 329.72 ml       Physical Examination:  GENERAL: Alert, is in no apparent distress and is well developed and well nourished.  LYMPH NODES: Neck somewhat swollen but no impressive adenopathy, no supraclavicular adenopathy, no axillary adenopathy  SKIN: Normal color, no skin rashes, petechiae present on BLE.   HEENT:  Roof of mouth and posterior throat pink with mild gingival inflammation. Edema and sloughing of upper anterior palate. Sloughing/lesions noted on bilateral sides of tongue.    CHEST: Respiratory effort is not labored.  LUNGS: Clear to auscultation bilaterally but diminished at bilateral bases  HEART: RRR, +murmur  ABDOMEN: Abdomen is soft, normoactive bowel sounds, nontender to palpation  NEUROLOGIC: No focal deficits  EXTREMITIES: +1 edema in BLE. No edema noted in BUE.     LABORATORY STUDIES:  Recent Labs   Lab 12/31/20  0626 12/30/20  0913 12/30/20  0553 12/29/20  0423   WBC 0.5*  --  0.5* 0.5*   RBC 2.70*  --  2.18* 2.27*   HCT 24.2*  --  20.3* 21.2*   HGB 8.2*  --  6.7* 7.0*   PLT 18* 28* 2* 18*     Recent Labs   Lab 12/31/20  0626 12/30/20  1644 12/30/20  0553  12/29/20  0423  12/28/20  0549 12/28/20  0117  12/25/20  1654 12/25/20  0642 12/24/20  1233   SODIUM 139  --  141  --  142  --  139  --    < >  --  134*  --    POTASSIUM 3.3* 3.1* 3.0*   < > 3.4   < > 3.5  --    < >  --  3.9  --    CHLORIDE 104  --  109*  --  111*  --  109*  --    < >  --  101  --    CO2 31  --  27  --  24  --  25  --    < >  --  29  --     GLUCOSE 150*  --  136*  --  142*  --  130*  --    < >  --  118*  --    BUN 7  --  3*  --  5*  --  4*  --    < >  --  7  --    CREATININE 0.62  --  0.54  --  0.52  --  0.54  --    < >  --  0.58  --    CALCIUM 8.7  --  8.3*  --  8.1*  --  7.6*  --    < >  --  8.2*  --    ALBUMIN 2.5*  --  2.3*  --  2.3*  --  2.1*  --    < >  --  2.4*  --    MG  --   --  2.0  --   --   --  2.0  --   --   --  1.9  --    BILIRUBIN 0.9  --  0.6  --  0.8  --  0.9  --    < >  --  1.1*  --    ALKPT 50  --  48  --  44*  --  39*  --    < >  --  42*  --    LACTA  --   --   --   --   --   --   --  0.7  --  0.6  --  1.4   AST 10  --  9  --  14  --  13  --    < >  --  7  --    GPT 17  --  19  --  21  --  20  --    < >  --  21  --     < > = values in this interval not displayed.         CLINICAL IMPRESSION & PLAN:  1. Acute Myelocytic Leukemia FLT3+ D1 late evening of 12/17/20  - 7+3 Induction D14 + Midostaurin d7 (12/31/20)  - Bmbx 12/15. Morphologic review demonstrates a marrow that is 80-90% cellular with 90% blasts based on manual differential count.  These findings are diagnostic of acute leukemia.  Flow cytometric immunophenotypic studies demonstrate that the blasts express , CD13, and CD33.  They also have dim CD19 expression with dim CD22 expression, both surface and cytoplasmic.   - Hydrea discontinued 12/17 with start of chemotherapy   - TLS labs.  Allopurinol and IVF. 12/22 no current s/sx of TLS and decreased WBC of 0.5. Allopurinol discontinued. IVFs decreased to 75cc/hr.   -  Midostaurin 12/25 (day 8 to 21).  Patient consent obtained and placed in chart. Nausea reported with start of midostaurin. Better tolerance of medication when taken with pudding. Symptoms controlled with antiemetics as needed.   - repeat bmbx completed on 12/30/20 with no expanded blast population identified      2. Cardiovascular  - EF 65% 12/15. Trileaflet aortic valve with mild aortic valve regurgitation.  - Chest pain on admission 12/14. EKG NSR,  Moderna negative troponin x 2    3. Pulmonary  - Current smoker. Patient will notify team if interested in nicotine patch  - IS placed at bedside. Patient encouraged to use 4x/hour in addition to walking and sitting in chair.   - 12/30 O2 sats 87-88% on RA, sensor switched from finger to toe with improvement in O2 sats in mid 90s. Pt denies SOB or difficulty breathing. Chest x-ray ordered showing fluid overload, lasix ordered and IVFs modified (see below).     4. Neuro  - No concerns    5. Fluid/electrolytes/nutrition/volume status  - Protein calorie malnutrition: [] None  [] Mild (serum albumin 3.1-3.4)  [] Moderate (serum albumin 2.4-3.0)  [x] Severe (serum albumin <2.4).  Please see note from dietary for full assessment of degree of severity  - Dex given 12/14 for throat pain. Persistent pain of gums, magic mouth providing minimal relief.  Oxycodone dose decreased to 2.5mg every 4 hours due to sedation with 5mg dose. Mouth pain improving following start of chemotherapy, continues to use oxycodone and magic mouthwash for pain control.  - Blood glucose elevated with steroid as part of treatment plan in 200s. SSI ordered. IVFs modified to 0.9%NS to eliminate additional source of dextrose. BS improved and SSI discontinued 12/30/20.   - 12/21 Patient up 5kg and net positive ~10L since admission. Patient denies SOB. Scant edema noted on exam. Continue to monitor need to decrease continuous IVFs and potential need for lasix.12/22 +1 edema BLE, IVFs decreased to 75cc/hr. Weight improving and swelling improving on 12/23.   12/27/20. Albumin dropping. Not eating  - 12/28 Continued poor oral intake and dropping albumin. Patient agreeable to trying to consume 2-3 ensure supplements per day. Will consider placing NG tube for tube feedings if unable to tolerate ensures and nutritional status continues to decline.   - 12/29 patient able to consume 2 ensures. Brief episode of SOB with start of ampho and associated fluids. Continuous IVF  rate decreased to 50cc/hr.   -12/30 Volume up with elevated BP's and 2kg weight gain.  40 mg IV Lasix ordered X1. Continuous IVFs discontinued. Chest xray ordered showing fluid overload.   - 12/31 Edema improved. Weight down ~4kg. Continue to monitor fluid status closely and evaluate need for diuretics vs. Fluids daily. Hypokalemia likely secondary to amphotericin. Scheduled potassium increased to 40mEq BID.     6. ID - fevers  - Start cefipime 12/14  - Strep negative, COVID negative.  Blood cultures drawn  - Possible herpes surrounding the left nare. Valtrex started 12/14. Plan for 10 days of treatment dosing then adjust to prophylactic dosing starting 12/22. Lesion below left nare significantly improved, small flat pink discoloration noted.   - 12/17 low grade fever of 100 noted prior to initiation of platelet transfusion. Transfusion held for 30 minutes with temperature recheck of 98.4. Transfusion started and at 15 minute tomy patient had a fever of 100.7. Platelet transfusion continued as patient had no other symptoms and fever likely secondary to disease rather than transfusion. Blood cultures, UA and culture ordered. Continue cefepime treatment course. Will hold off on adding second antimicrobial agent at this time.   - afebrile since the start of chemotherapy treatment on the evening of 12/17  - 12/23 fever of 100.3, pt felt chilled at time of fever, blood cultures and UA obtained. Patient remains on cefepime. Continue to monitor closely.  -12/24 febrile up to 100.5.  Patient reports feeling warm, denies chills. Remaining vitals stable. Blood cultures, lactic acid, UA/clx and chest x-ray ordered.12/26 dc'd cefipime and switch to zosyn.  Presume mucositis given prominent infiltration of gingiva and palate.  Use vanco for fever with mucositis.    12/27 still febrile and crp trending up.  Ct chest abd and pelvis today  - 12/28 persistent fevers. CRP mildly improved. CT sinus today. Antimicrobials modified to  meropenem and amphotericin. Repeat bmbx on 12/30 to evaluate for residual disease as potential source of fevers. Consider consulting ID if continues to be febrile.   - 12/29 afebrile for 24 hours. Continue vanco, meropenem, amphotericin.   - 12/30 afebrile 48+ hours. Last fever 100.5 on 12/28 at 0043. CRP trending down at 4.2. Vancomycin discontinued.     7. Hematology - leukocytosis, anemia, thrombocytopenia due to disease  - Transfuse 1 RCM for Hgb <7.0.  Transfuse 1 unit of platelets for platelet count <15.  All blood products should be irradiated.  12/20/2020: Needing plts daily. Will recheck plts after transfusion and will transfuse for less than 10. Will get PRBC as well.  12/22 remains plt transfusion dependent. Transfusion parameters increased to transfuse if <15 to avoid significant drops in levels. Plan to recheck plt level following to transfusion and transfuse second unit if remains <15.   - 12/30 epistaxis overnight resolved with pressure. Platelet count of 2 on AM labs. Transfused one unit with post count of 28  - 12/31 continues to have intermittent epistaxis with plt count of 18. Parameters increased to keep >20 with plan to transfuse one unit today.     8. GI  - increased risk constipation with zofran and oxycodone. PRN senna s and miralax ordered and encouraged although reports baseline of weekly BMs.   - increase in total bilirubin to 2.0, US of RUQ ordered and unremarkable. LFTs and alk phos within normal limits. Increase in total bilirubin likely secondary to posaconazole.  Will continue to monitor.   12/24 total bilirubin downtrending to 1.7  12/26 bili 1.3  12/30 incontinent of loose stool. Cdiff ordered and awaiting sample collection    9. Psych  - patient tearful regarding diagnosis and support outside of hospital. 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 Social work consult placed for discharge planning and psychosocial needs.  - Dr. Cox  consulted for cancer counseling.   - anti-depressant such as remeron offered. Patient declined offer at this time, feels she is coping appropriately and dose not feel she is depressed. She will notify staff if it is something she is interested in, in the future.     10. HEENT  - significant gingival hypertrophy with generalized red/purple coloration to palate and gums present on admission. Associated pain controlled with dex, oxycodone and magic mouthwash (see above). Improving with start of chemotherapy.   - patient report of feeling that teeth are loose and may be displaced easily such as with chewing. Dr. Alston consulted. Pain likely secondary to gum disease as seen on imaging per Dr. Alston. Plan for complete extraction and dentures once WBC and platelet counts recovered. Patient is agreeable with this plan.   - 12/28 new onset nasal drainage. Plan for CT sinuses 12/28/20 in presence of neutropenic fevers. Moderate mucosal thickening in the maxillary sinus and mild mucosal thickening in the right neck/sinuses. The remaining sinuses are clear.  12/29 increased swelling and sloughing of upper anterior palate and bilateral sides of tongue. Continue with oxycodone and magic mouthwash for pain control as needed.   12/30 epistaxis secondary to thrombocytopenia (see above). Encouraged to avoid blowing her nose and vaseline to moisturizer mucosa  12/31 epistaxis despite moisturization. Plan to increase platelet parameters (see above).       11. Prophylaxis  - DVT:  - GI: PPI  - PCP:  - Anti-viral: valacyclovir  - Anti-fungal: amphotericin  - Anti-bacterial: flagyl and meropenem    11. Discharge planning: ÁNGEL Duarte   [phone]    I have seen and examined the patient. No abnormal findings noted, discussed with APC and agree with the findings and plan as documented. Counseled the patient in regards to next steps.       The plan of care was discussed with the patient Continue present  supportive care with antibiotics including ampho.      Jasvir Martinez MD

## 2022-09-15 ENCOUNTER — RESULT REVIEW (OUTPATIENT)
Age: 52
End: 2022-09-15

## 2022-09-16 PROBLEM — Z86.018 HISTORY OF FIBROADENOMA OF RIGHT BREAST: Status: RESOLVED | Noted: 2021-03-25 | Resolved: 2021-07-19

## 2022-09-16 PROBLEM — R92.1 BREAST CALCIFICATIONS: Status: ACTIVE | Noted: 2022-09-16

## 2022-09-20 ENCOUNTER — APPOINTMENT (OUTPATIENT)
Dept: SURGICAL ONCOLOGY | Facility: CLINIC | Age: 52
End: 2022-09-20

## 2022-09-20 VITALS
WEIGHT: 240 LBS | SYSTOLIC BLOOD PRESSURE: 158 MMHG | HEART RATE: 66 BPM | DIASTOLIC BLOOD PRESSURE: 93 MMHG | BODY MASS INDEX: 35.55 KG/M2 | HEIGHT: 69 IN

## 2022-09-20 DIAGNOSIS — R92.1 MAMMOGRAPHIC CALCIFICATION FOUND ON DIAGNOSTIC IMAGING OF BREAST: ICD-10-CM

## 2022-09-20 DIAGNOSIS — N64.4 MASTODYNIA: ICD-10-CM

## 2022-09-20 DIAGNOSIS — Z86.018 PERSONAL HISTORY OF OTHER BENIGN NEOPLASM: ICD-10-CM

## 2022-09-20 PROCEDURE — 99213 OFFICE O/P EST LOW 20 MIN: CPT

## 2022-09-20 NOTE — HISTORY OF PRESENT ILLNESS
[FreeTextEntry1] : The patient is a 51 year old female with B/L breast masses s/p B/L excisional biopsies with reduction mammoplasty 6/18/2021\par \par Previous history:\par The patient reports that she previously had a Left breast mass in 2004 that was aspirated and reported got smaller/resolved, likely a cyst. She then notes feeling a left breast mass around 12/2019 but did not seek medical attention until recently when she thought it had gotten larger.\par \par Prior imaging:\par 10/23/2020 ([facility]) DM showed heterogeneously dense breasts.\par - R UOQ indeterminate microcalcifications were seen, N10\par - L UOQ 5 cm hyperdense mass, N4\par \par 10/23/2020 US\par  - R 3 x 1.6 x 1.2 cm lobulated isoechoic mass 7:00 N3\par  - L 4.7 x 4.7 x 2.0 lobulated complicated cystic isoechoic mass 2:00 N6\par  - LN negative\par \par 2/11/2021 R stereo bx\par  - hyalinized FA with calcifications, rec 6 mo F/U with mammogram\par \par She has not had any prior breast imaging. Today, the patient denies breast pain, skin changes, or nipple discharge.  She does note that she has longstanding back pain from her large (G cup) breasts.\par \par 7/20/2021 office visit: Pain is well-controlled, only concern currently is a pimple on left nipple and appearance of the incisions. No fevers, chills. Is massaging the incisions with vaseline and bio-oil--painful, but getting better.\par \par 10/22/2021 B/L SM ([facility]) scattered fibroglandular densities \par  - Few scattered benign punctate calcs \par  - No suspicious mass, architectural distortion, or clustered pleomorphic microcalcifications\par \par 10/22/2021 B/L US\par  - Negative. BR2\par \par 11/2/2021:\par The patient is healing well from the breast reduction. She continues to massage the incisions with cocoa butter. Back pain is much improved. Denies any breast symptoms. Today, the patient reports no breast complaints. Denies pain, masses, skin changes, or nipple discharge. \par \par 8/22/2022 B/L DM ([facility]) revealed heterogeneously dense breasts \par - L lateral N4 1 cm region of punctate calcs in a clustered distribution remain indeterminate on mag view-> stereo\par - B/L morphologically benign-appearing calcs which are including rim calcified oil cysts adjacent to the region of palpable concern in the right breast. \par - B/L stable postreduction mammoplasty/mastopexy distortion\par \par 8/22/2022 B/L US \par - B/L 6:00 N8 ill-defined regions of decreased echogenicity in the region of a postop scar-> targeted B/L US in 6 months\par - L 6:00 N8 0.8 x 0.7 x 1.2 cm discrete subdermal hypoechoic mass in the region of the postop scar which may represent evolving fat necrosis. \par - BR4\par \par 9/15/2022 L Stereo ([facility])\par - L (hourglass): pending\par \par Interval History:\par Pt noticed left breast pain starting April 2022. She was referred to get diagnostic breast imaging in 8/22 which showed indeterminate calcifications now s/p stereo biopsy. Reports that pain has resolved. Denies right breast pain. Also recently s/p hysterectomy for fibroids 6/22.

## 2022-09-20 NOTE — ASSESSMENT
[FreeTextEntry1] : The patient is a 51 year old female with B/L breast masses s/p excisional biopsy with reduction mammoplasty 6/18/2021, doing well.\par \par Most recent breast screening showed L breast postop changes, and also indeterminate calcs recommended for biopsy. Stereo done 9/15/2022, results pending\par \par Exam today shows well-healed incisions, some scar tissue noted inferiorly in the breasts. No obvious masses or adenopathy noted.\par \par Plan:\par  - L stereo pending\par  - Targeted B/L US 2/2023\par  - RTO after US if stereo bx is benign\par

## 2022-09-20 NOTE — CONSULT LETTER
[Dear  ___] : Dear  [unfilled], [Courtesy Letter:] : I had the pleasure of seeing your patient, [unfilled], in my office today. [Please see my note below.] : Please see my note below. [Consult Closing:] : Thank you very much for allowing me to participate in the care of this patient.  If you have any questions, please do not hesitate to contact me. [Sincerely,] : Sincerely, [FreeTextEntry3] : Rin Kelly MD\par Breast Surgeon\par Division of Surgical Oncology\par Department of Surgery\par [address] [place] [address]\[place] [address] \par Tel: [phone]\par Fax: [phone]\par Email: [email or web address]

## 2022-09-20 NOTE — PHYSICAL EXAM
[Normocephalic] : normocephalic [Atraumatic] : atraumatic [PERRL] : pupils equal, round and reactive to light [Sclera nonicteric] : sclera nonicteric [Supple] : supple [No Supraclavicular Adenopathy] : no supraclavicular adenopathy [No Cervical Adenopathy] : no cervical adenopathy [No Thyromegaly] : no thyromegaly [Examined in the supine and seated position] : examined in the supine and seated position [Symmetrical] : symmetrical [None] : no ptosis [No dominant masses] : no dominant masses in right breast  [No dominant masses] : no dominant masses left breast [No Nipple Retraction] : no left nipple retraction [No Nipple Discharge] : no left nipple discharge [Breast Nipple Inversion] : nipples not inverted [Breast Nipple Retraction] : nipples not retracted [Breast Nipple Flattening] : nipples not flattened [Breast Nipple Fissures] : nipples not fissured [Breast Abnormal Lactation (Galactorrhea)] : no galactorrhea [Breast Abnormal Secretion Bloody Fluid] : no bloody discharge [Breast Abnormal Secretion Serous Fluid] : no serous discharge [Breast Abnormal Secretion Opalescent Fluid] : no milky discharge [No Axillary Lymphadenopathy] : no left axillary lymphadenopathy [No Edema] : no edema [No Swelling] : no swelling [Full ROM] : full range of motion [de-identified] : non-labored respirations  [de-identified] : well-healed incisions B/L [de-identified] : inferiorly with nodularity along IMF incision, likely scar tissue

## 2022-11-02 NOTE — H&P PST ADULT - NSANTHTOTALSCORECAL_ENT_A_CORE
Peng Advancement Flap Text: The defect edges were debeveled with a #15 scalpel blade.  Given the location of the defect, shape of the defect and the proximity to free margins a Peng advancement flap was deemed most appropriate.  Using a sterile surgical marker, an appropriate advancement flap was drawn incorporating the defect and placing the expected incisions within the relaxed skin tension lines where possible. The area thus outlined was incised deep to adipose tissue with a #15 scalpel blade.  The skin margins were undermined to an appropriate distance in all directions utilizing iris scissors. 1

## 2022-11-11 ENCOUNTER — APPOINTMENT (OUTPATIENT)
Dept: SURGICAL ONCOLOGY | Facility: CLINIC | Age: 52
End: 2022-11-11

## 2022-11-15 NOTE — ED ADULT NURSE NOTE - BREATH SOUNDS, MLM
"Due to history of heart transplant along with dizziness and weakness symptoms recommend ED    Heart transplant in 2004    Started feeling sick Sat 12th: Cold body aches and headache, bad cough, struggling to breathe due to chest tightness. Shakey despite eating and drinking fluids, dizzy and weak.     Covid test last night was negative.    Reason for Disposition    Patient sounds very sick or weak to the triager    Additional Information    Negative: SEVERE difficulty breathing (e.g., struggling for each breath, speaks in single words)    Negative: Very weak (can't stand)    Negative: Sounds like a life-threatening emergency to the triager    Negative: Difficulty breathing and not from stuffy nose (e.g., not relieved by cleaning out the nose)    Negative: Runny nose is caused by pollen or other allergies    Negative: Cough is main symptom    Negative: Sore throat is main symptom    Answer Assessment - Initial Assessment Questions  1. ONSET: \"When did the nasal discharge start?\"       11/12  2. AMOUNT: \"How much discharge is there?\"       Clear nasal discharge  3. COUGH: \"Do you have a cough?\" If yes, ask: \"Describe the color of your sputum\" (clear, white, yellow, green)      Dry cough  4. RESPIRATORY DISTRESS: \"Describe your breathing.\"       Wheezing short of breath  5. FEVER: \"Do you have a fever?\" If Yes, ask: \"What is your temperature, how was it measured, and when did it start?\"      No 97.2  6. SEVERITY: \"Overall, how bad are you feeling right now?\" (e.g., doesn't interfere with normal activities, staying home from school/work, staying in bed)       Weakness, shaking  7. OTHER SYMPTOMS: \"Do you have any other symptoms?\" (e.g., sore throat, earache, wheezing, vomiting)      Wheezing sore throat  8. PREGNANCY: \"Is there any chance you are pregnant?\" \"When was your last menstrual period?\"      no  97.2    Possible exposure to influenza    Protocols used: COMMON COLD-ELLIE Villa RN    Triage Nurse  Eliazar " [facility]  Appointment line: [phone]  [facility], 24 hour nurse line, available by calling clinic at [phone] and following prompts.          Clear

## 2023-01-06 NOTE — H&P PST ADULT - FUNCTIONAL ASSESSMENT - BASIC MOBILITY PT AGE POP HIDDEN
PAST MEDICAL HISTORY:  Amyotrophic lateral sclerosis (ALS)     Diabetes     Diabetes mellitus      Adult

## 2023-01-08 ENCOUNTER — TRANSCRIPTION ENCOUNTER (OUTPATIENT)
Age: 53
End: 2023-01-08

## 2023-01-08 ENCOUNTER — RESULT REVIEW (OUTPATIENT)
Age: 53
End: 2023-01-08

## 2023-01-09 ENCOUNTER — INPATIENT (INPATIENT)
Facility: HOSPITAL | Age: 53
LOS: 1 days | Discharge: HOME | DRG: 343 | End: 2023-01-11
Attending: SURGERY | Admitting: SURGERY
Payer: MEDICAID

## 2023-01-09 ENCOUNTER — TRANSCRIPTION ENCOUNTER (OUTPATIENT)
Age: 53
End: 2023-01-09

## 2023-01-09 VITALS
TEMPERATURE: 97 F | WEIGHT: 235.89 LBS | OXYGEN SATURATION: 98 % | HEIGHT: 69 IN | DIASTOLIC BLOOD PRESSURE: 94 MMHG | SYSTOLIC BLOOD PRESSURE: 155 MMHG | RESPIRATION RATE: 18 BRPM | HEART RATE: 70 BPM

## 2023-01-09 DIAGNOSIS — Z98.890 OTHER SPECIFIED POSTPROCEDURAL STATES: Chronic | ICD-10-CM

## 2023-01-09 DIAGNOSIS — K35.80 UNSPECIFIED ACUTE APPENDICITIS: ICD-10-CM

## 2023-01-09 DIAGNOSIS — Z90.710 ACQUIRED ABSENCE OF BOTH CERVIX AND UTERUS: Chronic | ICD-10-CM

## 2023-01-09 LAB
ALBUMIN SERPL ELPH-MCNC: 3.4 G/DL — LOW (ref 3.5–5)
ALP SERPL-CCNC: 83 U/L — SIGNIFICANT CHANGE UP (ref 40–120)
ALT FLD-CCNC: 33 U/L DA — SIGNIFICANT CHANGE UP (ref 10–60)
ANION GAP SERPL CALC-SCNC: 5 MMOL/L — SIGNIFICANT CHANGE UP (ref 5–17)
APPEARANCE UR: CLEAR — SIGNIFICANT CHANGE UP
APTT BLD: 29.4 SEC — SIGNIFICANT CHANGE UP (ref 27.5–35.5)
AST SERPL-CCNC: 16 U/L — SIGNIFICANT CHANGE UP (ref 10–40)
BASOPHILS # BLD AUTO: 0.05 K/UL — SIGNIFICANT CHANGE UP (ref 0–0.2)
BASOPHILS NFR BLD AUTO: 0.5 % — SIGNIFICANT CHANGE UP (ref 0–2)
BILIRUB SERPL-MCNC: 0.4 MG/DL — SIGNIFICANT CHANGE UP (ref 0.2–1.2)
BILIRUB UR-MCNC: NEGATIVE — SIGNIFICANT CHANGE UP
BUN SERPL-MCNC: 7 MG/DL — SIGNIFICANT CHANGE UP (ref 7–18)
CALCIUM SERPL-MCNC: 9.4 MG/DL — SIGNIFICANT CHANGE UP (ref 8.4–10.5)
CHLORIDE SERPL-SCNC: 106 MMOL/L — SIGNIFICANT CHANGE UP (ref 96–108)
CO2 SERPL-SCNC: 27 MMOL/L — SIGNIFICANT CHANGE UP (ref 22–31)
COLOR SPEC: YELLOW — SIGNIFICANT CHANGE UP
CREAT SERPL-MCNC: 0.67 MG/DL — SIGNIFICANT CHANGE UP (ref 0.5–1.3)
DIFF PNL FLD: ABNORMAL
EGFR: 106 ML/MIN/1.73M2 — SIGNIFICANT CHANGE UP
EOSINOPHIL # BLD AUTO: 0.31 K/UL — SIGNIFICANT CHANGE UP (ref 0–0.5)
EOSINOPHIL NFR BLD AUTO: 3.3 % — SIGNIFICANT CHANGE UP (ref 0–6)
GLUCOSE SERPL-MCNC: 105 MG/DL — HIGH (ref 70–99)
GLUCOSE UR QL: NEGATIVE — SIGNIFICANT CHANGE UP
HCG SERPL-ACNC: <1 MIU/ML — SIGNIFICANT CHANGE UP
HCT VFR BLD CALC: 38.3 % — SIGNIFICANT CHANGE UP (ref 34.5–45)
HGB BLD-MCNC: 13.2 G/DL — SIGNIFICANT CHANGE UP (ref 11.5–15.5)
IMM GRANULOCYTES NFR BLD AUTO: 0.6 % — SIGNIFICANT CHANGE UP (ref 0–0.9)
INR BLD: 1.17 RATIO — HIGH (ref 0.88–1.16)
KETONES UR-MCNC: ABNORMAL
LEUKOCYTE ESTERASE UR-ACNC: NEGATIVE — SIGNIFICANT CHANGE UP
LIDOCAIN IGE QN: 91 U/L — SIGNIFICANT CHANGE UP (ref 73–393)
LYMPHOCYTES # BLD AUTO: 1.41 K/UL — SIGNIFICANT CHANGE UP (ref 1–3.3)
LYMPHOCYTES # BLD AUTO: 14.9 % — SIGNIFICANT CHANGE UP (ref 13–44)
MCHC RBC-ENTMCNC: 31.1 PG — SIGNIFICANT CHANGE UP (ref 27–34)
MCHC RBC-ENTMCNC: 34.5 GM/DL — SIGNIFICANT CHANGE UP (ref 32–36)
MCV RBC AUTO: 90.3 FL — SIGNIFICANT CHANGE UP (ref 80–100)
MONOCYTES # BLD AUTO: 0.62 K/UL — SIGNIFICANT CHANGE UP (ref 0–0.9)
MONOCYTES NFR BLD AUTO: 6.6 % — SIGNIFICANT CHANGE UP (ref 2–14)
NEUTROPHILS # BLD AUTO: 7.01 K/UL — SIGNIFICANT CHANGE UP (ref 1.8–7.4)
NEUTROPHILS NFR BLD AUTO: 74.1 % — SIGNIFICANT CHANGE UP (ref 43–77)
NITRITE UR-MCNC: NEGATIVE — SIGNIFICANT CHANGE UP
NRBC # BLD: 0 /100 WBCS — SIGNIFICANT CHANGE UP (ref 0–0)
PH UR: 5 — SIGNIFICANT CHANGE UP (ref 5–8)
PLATELET # BLD AUTO: 289 K/UL — SIGNIFICANT CHANGE UP (ref 150–400)
POTASSIUM SERPL-MCNC: 4 MMOL/L — SIGNIFICANT CHANGE UP (ref 3.5–5.3)
POTASSIUM SERPL-SCNC: 4 MMOL/L — SIGNIFICANT CHANGE UP (ref 3.5–5.3)
PROT SERPL-MCNC: 7.4 G/DL — SIGNIFICANT CHANGE UP (ref 6–8.3)
PROT UR-MCNC: 15
PROTHROM AB SERPL-ACNC: 13.9 SEC — HIGH (ref 10.5–13.4)
RBC # BLD: 4.24 M/UL — SIGNIFICANT CHANGE UP (ref 3.8–5.2)
RBC # FLD: 13 % — SIGNIFICANT CHANGE UP (ref 10.3–14.5)
SARS-COV-2 RNA SPEC QL NAA+PROBE: SIGNIFICANT CHANGE UP
SODIUM SERPL-SCNC: 138 MMOL/L — SIGNIFICANT CHANGE UP (ref 135–145)
SP GR SPEC: 1.02 — SIGNIFICANT CHANGE UP (ref 1.01–1.02)
UROBILINOGEN FLD QL: NEGATIVE — SIGNIFICANT CHANGE UP
WBC # BLD: 9.46 K/UL — SIGNIFICANT CHANGE UP (ref 3.8–10.5)
WBC # FLD AUTO: 9.46 K/UL — SIGNIFICANT CHANGE UP (ref 3.8–10.5)

## 2023-01-09 PROCEDURE — 88304 TISSUE EXAM BY PATHOLOGIST: CPT | Mod: 26

## 2023-01-09 PROCEDURE — 99222 1ST HOSP IP/OBS MODERATE 55: CPT | Mod: 57

## 2023-01-09 PROCEDURE — 44970 LAPAROSCOPY APPENDECTOMY: CPT | Mod: AS

## 2023-01-09 PROCEDURE — 93010 ELECTROCARDIOGRAM REPORT: CPT

## 2023-01-09 PROCEDURE — 44970 LAPAROSCOPY APPENDECTOMY: CPT

## 2023-01-09 PROCEDURE — 99285 EMERGENCY DEPT VISIT HI MDM: CPT

## 2023-01-09 PROCEDURE — 74177 CT ABD & PELVIS W/CONTRAST: CPT | Mod: 26,MA

## 2023-01-09 DEVICE — STAPLER COVIDIEN TRI-STAPLE 60MM BLACK RELOAD: Type: IMPLANTABLE DEVICE | Status: FUNCTIONAL

## 2023-01-09 RX ORDER — ENOXAPARIN SODIUM 100 MG/ML
40 INJECTION SUBCUTANEOUS EVERY 24 HOURS
Refills: 0 | Status: DISCONTINUED | OUTPATIENT
Start: 2023-01-09 | End: 2023-01-09

## 2023-01-09 RX ORDER — SODIUM CHLORIDE 9 MG/ML
1000 INJECTION, SOLUTION INTRAVENOUS
Refills: 0 | Status: DISCONTINUED | OUTPATIENT
Start: 2023-01-09 | End: 2023-01-09

## 2023-01-09 RX ORDER — ONDANSETRON 8 MG/1
4 TABLET, FILM COATED ORAL EVERY 6 HOURS
Refills: 0 | Status: DISCONTINUED | OUTPATIENT
Start: 2023-01-09 | End: 2023-01-09

## 2023-01-09 RX ORDER — GLUCAGON INJECTION, SOLUTION 0.5 MG/.1ML
1 INJECTION, SOLUTION SUBCUTANEOUS ONCE
Refills: 0 | Status: DISCONTINUED | OUTPATIENT
Start: 2023-01-09 | End: 2023-01-09

## 2023-01-09 RX ORDER — CEFOTETAN DISODIUM 1 G
1 VIAL (EA) INJECTION ONCE
Refills: 0 | Status: DISCONTINUED | OUTPATIENT
Start: 2023-01-09 | End: 2023-01-10

## 2023-01-09 RX ORDER — INSULIN LISPRO 100/ML
VIAL (ML) SUBCUTANEOUS EVERY 6 HOURS
Refills: 0 | Status: DISCONTINUED | OUTPATIENT
Start: 2023-01-09 | End: 2023-01-09

## 2023-01-09 RX ORDER — SODIUM CHLORIDE 9 MG/ML
1000 INJECTION INTRAMUSCULAR; INTRAVENOUS; SUBCUTANEOUS ONCE
Refills: 0 | Status: COMPLETED | OUTPATIENT
Start: 2023-01-09 | End: 2023-01-09

## 2023-01-09 RX ORDER — DEXTROSE 50 % IN WATER 50 %
12.5 SYRINGE (ML) INTRAVENOUS ONCE
Refills: 0 | Status: DISCONTINUED | OUTPATIENT
Start: 2023-01-09 | End: 2023-01-09

## 2023-01-09 RX ORDER — KETOROLAC TROMETHAMINE 30 MG/ML
30 SYRINGE (ML) INJECTION EVERY 6 HOURS
Refills: 0 | Status: DISCONTINUED | OUTPATIENT
Start: 2023-01-09 | End: 2023-01-09

## 2023-01-09 RX ORDER — DEXTROSE 50 % IN WATER 50 %
25 SYRINGE (ML) INTRAVENOUS ONCE
Refills: 0 | Status: DISCONTINUED | OUTPATIENT
Start: 2023-01-09 | End: 2023-01-09

## 2023-01-09 RX ORDER — DEXTROSE 50 % IN WATER 50 %
15 SYRINGE (ML) INTRAVENOUS ONCE
Refills: 0 | Status: DISCONTINUED | OUTPATIENT
Start: 2023-01-09 | End: 2023-01-09

## 2023-01-09 RX ADMIN — SODIUM CHLORIDE 1000 MILLILITER(S): 9 INJECTION INTRAMUSCULAR; INTRAVENOUS; SUBCUTANEOUS at 19:58

## 2023-01-09 NOTE — ED PROVIDER NOTE - OBJECTIVE STATEMENT
53 y/o female, history of hysterectomy this summer for heavy bleeding and fibroids, presents w/ 3 days of right lower abdominal pain, vomiting, and bloating. Today, she went to her doctor who sent her to the ER for CAT scan. Patient denies any urinary symptoms or fever. NKDA.

## 2023-01-09 NOTE — H&P ADULT - NSHPLABSRESULTS_GEN_ALL_CORE
13.2   9.46  )-----------( 289      ( 09 Jan 2023 16:30 )             38.3   01-09    138  |  106  |  7   ----------------------------<  105<H>  4.0   |  27  |  0.67    Ca    9.4      09 Jan 2023 16:30    TPro  7.4  /  Alb  3.4<L>  /  TBili  0.4  /  DBili  x   /  AST  16  /  ALT  33  /  AlkPhos  83  01-09      < from: CT Abdomen and Pelvis w/ IV Cont (01.09.23 @ 18:57) >    FINDINGS:  LOWER CHEST:Within normal limits.    LIVER: Within normal limits.  BILE DUCTS: Normal caliber.  GALLBLADDER: Within normal limits.  SPLEEN: Within normal limits.  PANCREAS: Within normal limits.  ADRENALS: Within normal limits.  KIDNEYS/URETERS: No hydronephrosis or urolithiasis. Small rounded   hypodensity lower pole left kidney too small to characterize.    BLADDER: Minimally distended.  REPRODUCTIVE ORGANS: Hysterectomy. Right adnexal cyst measures 3.3 cm.    BOWEL: No bowel obstruction. Appendix is hyperemic and distended with   extensive surrounding inflammatory change consistent with appendicitis.   There is colonic diverticulosis without diverticulitis  PERITONEUM: No ascites.  VESSELS: Within normal limits.  RETROPERITONEUM/LYMPH NODES: No lymphadenopathy.  ABDOMINAL WALL: Within normal limits.  BONES: Mild degenerative changes.    IMPRESSION:  Findings consistent with appendicitis without evidence of perforation or   abscess  Right adnexal cyst may be further evaluated with ultrasound on a   nonemergent basis    < end of copied text >

## 2023-01-09 NOTE — ED ADULT NURSE REASSESSMENT NOTE - NS ED NURSE REASSESS COMMENT FT1
Transferred to ED Holding reports given to Krzysztof BULLARD ,reports given .Was about to go to OR when Lynne BULLARD from OR informed us in intake to keep patient in ED and will call us when OR is ready .Raegan nurse in charge and Haley manager aware .

## 2023-01-09 NOTE — CONSULT NOTE ADULT - SUBJECTIVE AND OBJECTIVE BOX
52 year f with abd  pain ct appendicitis   labs  noted   full note to follow   cleared for surgery    benefit out weight the risk

## 2023-01-09 NOTE — ED PROVIDER NOTE - NSICDXPASTSURGICALHX_GEN_ALL_CORE_FT
PAST SURGICAL HISTORY:  H/O colonoscopy     H/O endoscopy     H/O lumpectomy LEFT Breast    History of D&C     S/P bilateral breast reduction       H/O: hysterectomy

## 2023-01-09 NOTE — H&P ADULT - NSICDXPASTMEDICALHX_GEN_ALL_CORE_FT
PAST MEDICAL HISTORY:  Abnormal uterine and vaginal bleeding, unspecified     Fibroids     History of heavy vaginal bleeding     Hypertension     Iron deficiency anemia due to chronic blood loss     Missed  X 2    Type 2 diabetes mellitus

## 2023-01-09 NOTE — H&P ADULT - ASSESSMENT
53 y/o female with PMHx of HTN, DM, HLD,  PSHx of hysterectomy this summer for heavy bleeding and fibroids, presents w/ 3 days of right lower abdominal pain, vomiting, and bloating. Described as an 8/10 on pain scale , sharp and persistent. Afebrile , No leucocytosis. CT shows Acute Appendicitis    Admit to Dr Sousa  NPO, IVF, IV ABx   OR immediately for lap possible open appendectomy   f/up labs inc covid   DM- ISS, POC Glucose, HgB A1c  HTN-stable for now , Resume PO antihypertensives post op  DVT PPx      51 y/o female with PMHx of HTN, DM, HLD,  PSHx of hysterectomy this summer for heavy bleeding and fibroids, presents w/ 3 days of right lower abdominal pain, vomiting, and bloating. Described as an 8/10 on pain scale , sharp and persistent. Afebrile , No leucocytosis. CT shows Acute Appendicitis    Admit to Dr Vincent  NPO, IVF, IV ABx   OR immediately for lap possible open appendectomy   f/up labs inc covid   DM- ISS, POC Glucose, HgB A1c  HTN-stable for now , Resume PO antihypertensives post op  DVT PPx

## 2023-01-09 NOTE — ED ADULT NURSE NOTE - PAIN: BODY LOCATION
NEONATOLOGY DAILY PROGRESS NOTE      Subjective     Boy Lizeth Burleson is a 4 week old old former Gestational Age: 32w6d, birthweight 2040 g male infant admitted to the NICU for Prematurity and Kim Syndrome.     Corrected Gestational Age today is 37w4d.    Weight today is 2758 g (08/08/22 2100).     Overnight Events:  Stable,  occasionally tachypneic with feeds    Objective     Vitals    24 Hour Range   Temperature   Temp  Min: 97.9 °F (36.6 °C)  Max: 98.6 °F (37 °C)   Pulse   Pulse  Min: 160  Max: 190   Respiratory   Resp  Min: 29  Max: 83   Blood Pressure   BP  Min: 78/45  Max: 99/45   Pulse Oximetry    SpO2  Min: 90 %  Max: 100 %   Vital signs reviewed    Current Weight 2758 g (08/08/22 2100)   Most recent weights:  Weight    08/01/22 2100 08/03/22 2100 08/05/22 2000 08/08/22 2100   Weight: (!) 2390 g 2515 g 2675 g 2758 g     Weight change:        Apnea/Bradycardia/Desaturation in last 24 hours    No data found.      Lines,Tube Drains    Intubation  Necessity/Indication: Not Intubated  Readiness for Extubation discussed - N/A 2022    Urinary Catheter  Necessity/Indication: Not Catheterized  Need for Urinary Catheter discussed - N/A 2022    Central Line  Type of Central Line:   Peripheral IV 07/07/22 Left Hand 24 (Active)     Necessity/Indication: No Central line in place  Need for Central Line discussed N/A 2022    Chest Tube  No 2022    Fluids  Based off a Weight: 2758 g     Last 24H:     Intake/Output  Report      08/08 0700 08/09 0659 08/09 0700  08/10 0659    P.O. (mL/kg) 88 (31.91)     NG/GT (mL/kg) 344 (124.73)     Total Intake(mL/kg) 432 (156.64)     Urine (mL/kg/hr)      Stool (mL/kg/hr)      Total Output(mL/kg)      Net +432           Urine Occurrence 5 x     Stool Occurrence 4 x           Urine: Urine Occurrence  Min: 1  Max: 1 Last Stool: 1 (08/08/22 1500)    Labs (Last 24 hours)  No results  found for this or any previous visit (from the past 24 hour(s)).   Labs Reviewed    Medications  Current Facility-Administered Medications   Medication   • [START ON 2022] pediatric multivitamin with iron (POLY-VI-SOL WITH IRON) oral solution 1 mL   • glycerin pediatric suppository 0.25 suppository        Physical Exam    General: Hypertelorism, Atypical facies, triangular face  Eye: Red reflex (deferred).    HENT:  Normocephalic, Anterior fontanelle open/soft/flat, Ears appear low set, Palate intact, redundant, excessive posterior neck skin fold.    Neck:  No thyromegaly, clavicles intact.    Respiratory:  Lungs are clear to auscultation, Respirations are intermittently-labored, Breath sounds are equal.    Cardiovascular:  Normal rate, Regular rhythm, Normal peripheral perfusion.  Soft systolic murmur 1/6 at left sternal border  Gastrointestinal:  Soft, but distended, No organomegaly, Anus patent.    Genitourinary:  Bilateral undescended testes  Musculoskeletal: No deformity, bilateral ankle swelling, bilateral somewhat large, 1st toes.     Integumentary:  Pink, No pallor, No rash, no abornormal nevi or hemangiomas noted   Neurologic:  Normal deep tendon reflexes, No focal deficits, normal tone for gestational age.        Assessment & Plan  by system     Former Gestational Age: 32w6d male infant, now corrected to 37w4d    Boy Lizeth Burleson requires  Intensive Care for Prematurity and [place]et[place] Syndrome. With the need for continuous cardiorespiratory monitoring, monitoring of feeding tolerance, and temperature control.    Fluids, Electrolytes and Nutrition:  Assessment:  infant  Serum Electrolytes - Normal.    - NPO: No  - NG/OG: Yes  - TFI: 155  - PO% - 20  - Feeds - MBM or SSC 24 at 54 ml Q3h PO/NG 24 keila/oz over 1 hr    -  NPO & LIS due to abd distention  -  Restart feeds, TPN-IL, Calcium Rx for hypocalcemia  - 7/10 Calcium up to 7.5, Sodium 150, tolerating feeds, and stooling. KUB looks  improved with less dilated loops, but still not much air in the rectum. Responds to glycerin chip. Benign abdominal examination. Off IV calcium drip  - 7/11: Calcium 8.5  - 7/21: tachypnea persists - limiting PO trial  - 7/23: less tachypneic - will try PO if showing cues  - 7/25: Some ABDs with feeds, ST to assess today  - 7/26: SLP: PO feeding x10mL MAX when awake and cueing  - 8/1: Dietician note: mild malnutrition.  - 8/8: No longer in malnutrition    Plan:  - Feeds -  Increase to feeds of 56mls q3hrs, 24kcals  - Goal -165  - Added MVI on 07/13  - SLP: PO feeding x 15 mL MAX when awake and cueing  - okay to breastfeed    Respiratory System:  Assessment: Respiratory Distress Syndrome (RDS)    Mode: High Flow Nasal Canula    HFNC SETTINGS:    SETTING LAST VALUE   FLOW  1 L/min (08/09/22 0805)   FiO2  21 % (08/09/22 0805)     7/7: Placed on NIPPV upon admission to NICU  7/8: Weaned to CPAP+6 this am, but still tachypneic to the upper 90s.  - will switch to NCNIMV, evaluate need for Surfactant. CXR at 18:00  7/10: Improved lung volumes on CXR, stable gas, will switch to 3LHFNC/21%  7/13: Switch to 1LHFNC, clinically stable  7/15: Increased to 2LHFNC this am 2/2 tachypnea  7/17: Escalated to CPAP overnight due to tachypnea and desats.  When looking back at CXR form 7/16 - possible small R PTX, however this is not seen on today's CXR  7/18: improved w/ NCPAP +6; FiO2 = 21%.  No PTX on morning CXR  7/19: CXR - B/L hazy, improving. 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)  7/22: 3 L HFNC, tachypnea improving, s/p lasix x 3 days  7/23: 2L HFNC 21%  7/25: Stable on 2LHFNC/21%  7/28: po lasix x 3 days  - 7/31: Off lasix, stable  - 8/6: wean to HFNC at 1 lpm    - Surfactant: Not given    Plan:  - Blood gas frequency: PRN  -  weaning Respiratory Support as tolerated if applicable  -  Clinically monitor       Apnea/Bradycardia/Desaturations:  Assessment:  At Risk for Apnea of Prematurity    - Last Apnea:     - Intervention: Reposition;Tactile stimulation, mild (07/31/22 1930)  - Last Desaturation: Event SpO2: 61 (07/31/22 1930)  Desaturation (secs): 20 secs (07/31/22 1930)  - Intervention: Intervention: Reposition;Tactile stimulation, mild (07/31/22 1930)    - Last Bradycardia: 18 secs (07/31/22 1930);  - Interventions: Intervention: Reposition;Tactile stimulation, mild (07/31/22 1930)    - Activity Prior to Event: Sleeping    - Caffeine Loading dose 20mg/kg/dose - Yes - Date: 7/11   - Caffeine maintenance 10mg/kg qday - No    Plan:  - - Continuous cardiorespiratory monitoring  - Monitor clinically for spells     Cardio Vascular System:   Assessment: No active cardiac issues  - 7/8 ECHO  Patent ductus arteriosus, small, bidirectional (normal for age).  Bidirectional shunting suggests pulmonary hypertension (may be normal for age).  Patent foramen ovale, small, left to right (normal for age).  Normal valve structure and function.      -7/15: Echo from 07/14:  Patent foramen ovale, small, left to right (normal for age).  Normal intracardiac with no other apparent septal defects.    Normal valve structure and function.    Normal biventricular size, wall thickness, and systolic function.  No aortic arch abnormalities demonstrated.  No evidence of pulmonary hypertension.  No pericardial or obvious pleural effusion.     Compared to previous echo on 7-8-22, previous PDA has closed and pulmonary hypertension has resolved.     Plan:  - - Continuous Cardiopulmonary monitoring       Gastrointestinal System:  Assessment: Abdominal distension -resolved  -     Plan:  - See FEN section    /Renal System:  Assessment: -bilateral undescended testes  - 7/8 US testes:   Testes are present within the inguinal canal  bilaterally.  Each is normal in size and morphology given prematurity.  The right measures up to 1.1  cm in longest axis and the left measures up to 0.8 cm.  Testicular echotexture is relatively smooth and unremarkable.  Doppler flow is demonstrated within each testicle.  Epididymes are not well defined, presumably due to small size.  The scrotum is empty.    Plan:  - follow clinically    Hematology:  Assessment: None     Baby's blood type is O Rh Positive;  Selene: Negative  Maternal blood type is   Information for the patient's mother:  Lizeth Burleson [[id number]]   O Rh Positive     Last Bilirubin:   No results found   Last Hg:   HGB (g/dL)   Date Value   2022 11.5 (L)     Phototherapy 7/9 - 7/15    Number of PRBC transfusions: 0  Most Recent transfusion:     Plan:  - no current issues      Infectious Disease:  Assessment: No concerns for infection at this time    Early onset sepsis screen:  Received 36 hours of empiric antibiotics at birth.  Blood culture negative    Plan:  - no current issues    Endocrine System:  Assessment: At risk for Hypoglycemia  -     Plan:  - No active issues       Central Nervous System:  Assessment: At risk for developmental delays  - 7/8 Head US: Lateral cerebral ventricles are normal and symmetric in size and morphology.  Slight prominence of echoes in the caudothalamic groove on the right could represent very small grade 1 hemorrhage.  There is otherwise no suggestion of subependymal, intraventricular or intraparenchymal hemorrhage. Resistive index of pericallosal arteries is approximately 0.6.      -7/15: HUS from 07/14:  No intracranial hemorrhage is evident.    Plan:   - PT/OT    Ophthalmology:  Assessment: None  - 7/15 Eye exam done for congenital abnormalities  ASSESSMENT: Healthy eyes, minor lid anomaly OU  PLAN: Observe eyes only, repeat exam in 6 months  Miguel Ángel Dowell MD    Genetics:  Assessment: Lawrence syndrome    7/8: Multiple anomalies. See physical examination.   Hypertelorism, Atypical facies, Ears appear low set, redundant, excessive posterior neck skin fold,  bilateral ankle swelling, bilateral undescended testes.    - Mom did not get any genetic testing/amnio as she refused during pregnancy, although abnormal level II US.    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  Gene: RAF1 ([id number])  Nucleic Acid Change: c.1837C>G; Heterozygous  Amino Acid Alteration: [id number]  Inheritance: Autosomal Dominant    Reviewed the report and possible implications on the baby with mother, along with grandmother. Need SW support to find more support, refer to genetics for further management and parental testing. RM    - Head US- normal  - Eye Exam normal  - Echo- normal  - Scrotal US- undescended testes.  - Abdominal U/S Complete - Normal. Mild left and minimal right pelviectasis.    Plan:  - consult to the , Genetics counselor outpatient     Musculo Skeletal:  Assessment: Bilateral ankle swelling    Plan:  - monitor for now    Therapies:   PT/OT & SLP consults per unit guidelines      Screenings & Procedures   Immunizations:   Most Recent Immunizations   Administered Date(s) Administered   • Hep B, adolescent or pediatric 2022     Levasy Hearing Test:    Levasy ROP Eye Exam Needed?: Yes (07/15/22 1230) No  Car Seat Screen:    CCHD Screening:   Screening complete: Not Applicable (Echo done ) (22)  Right hand reading %:    Foot reading %:    CHD:  (Echo done )  Circumcision:     State Screen- date drawn (most recent results): 22 (28th day NBS) (22 0530)  Last 3 results: 22 (28th day NBS) (22 0530)  Synagis Candidate: No (22 1900)      Parents plan to follow-up as an outpatient following discharge home with - TBD    Update Parents routinely.    I have spoken with the nursing staff and the healthcare team and reviewed findings and plan of management.      Updated Problem List under \" Problem List \" section - Yes 2022    Principal Problem:    Baby premature 32 weeks  Active Problems:    RDS (respiratory distress syndrome in the )    Other feeding problems of     Tillar syndrome  Resolved Problems:    Observation and evaluation of  for suspected infectious condition    Polyhydramnios, maternal, antepartum, third trimester, fetus 1    Fetus affected by placental abruption    Acidosis of     Congenital anomalies of ear, face, and neck    IVH (intraventricular hemorrhage) (CMS/HCC)    Hypocalcemia,     Hyperbilirubinemia    Malnutrition (CMS/HCC)     ABDOMEN/lower/Right:

## 2023-01-09 NOTE — H&P ADULT - NSHPPHYSICALEXAM_GEN_ALL_CORE
Vital Signs Last 24 Hrs  T(C): 36.8 (09 Jan 2023 20:10), Max: 36.8 (09 Jan 2023 20:10)  T(F): 98.2 (09 Jan 2023 20:10), Max: 98.2 (09 Jan 2023 20:10)  HR: 68 (09 Jan 2023 20:10) (68 - 70)  BP: 164/88 (09 Jan 2023 20:10) (155/94 - 164/88)  BP(mean): --  RR: 16 (09 Jan 2023 20:10) (16 - 18)  SpO2: 99% (09 Jan 2023 20:07) (98% - 99%)        General:  A&Ox3,Appears stated age, No acute distress,  Head: NC/AT  EENT: PERRLA. EOMI. Conjunctiva and sclera clear. Pharynx clear.  Neck: Supple. No JVD  Lungs: CTA B/l. Nonlabored Respirations  CV: +S1S2, RRR  Abdomen: Soft, Nondistended, ++RLQ tenderness, no guarding, no rebound  Extremities: Warm and well perfused. 2+ peripheral pulses b/l. Calf soft, nontender b/l. No pedal edema.

## 2023-01-09 NOTE — H&P ADULT - NSICDXPASTSURGICALHX_GEN_ALL_CORE_FT
PAST SURGICAL HISTORY:  H/O colonoscopy     H/O endoscopy     H/O lumpectomy LEFT Breast    H/O: hysterectomy     History of D&C     S/P bilateral breast reduction

## 2023-01-09 NOTE — H&P ADULT - HISTORY OF PRESENT ILLNESS
51 y/o female with PMHx of HTN, PSHx of hysterectomy this summer for heavy bleeding and fibroids, presents w/ 3 days of right lower abdominal pain, vomiting, and bloating. Described as an 8/10 on pain scale , sharp and persistent. Today, she went to her doctor who sent her to the ER for CAT scan. Patient denies any urinary symptoms or fever, chills, SOB, lightheadedness, or dysuria.  No other complaints at this time .   51 y/o female with PMHx of HTN, DM, HLD,  PSHx of hysterectomy this summer for heavy bleeding and fibroids, presents w/ 3 days of right lower abdominal pain, vomiting, and bloating. Described as an 8/10 on pain scale , sharp and persistent. Today, she went to her doctor who sent her to the ER for CAT scan. Patient denies any urinary symptoms or fever, chills, SOB, lightheadedness, or dysuria.  No other complaints at this time .

## 2023-01-09 NOTE — ED PROVIDER NOTE - NSICDXPASTMEDICALHX_GEN_ALL_CORE_FT
PAST MEDICAL HISTORY:  Abnormal uterine and vaginal bleeding, unspecified     Hypertension     Iron deficiency anemia due to chronic blood loss     Missed  X 2    Type 2 diabetes mellitus       Fibroids     History of heavy vaginal bleeding

## 2023-01-09 NOTE — H&P ADULT - NS ATTEND AMEND GEN_ALL_CORE FT
Pt seen and examined in ED. After some thought, she reports having pain for ~ 5 days.  Pain worsened and she presented to ED. Reviewed CT images, c/w acute appendicitis, no perforation. Abd- + RLQ tenderness to palpation, mild rebound tenderness RLQ.  Appendectomy indicated. PARQ discussion held regarding potential risks and complications of procedure. Pt expressed understanding of everything discussed and consented to procedure.

## 2023-01-10 LAB
A1C WITH ESTIMATED AVERAGE GLUCOSE RESULT: 6.8 % — HIGH (ref 4–5.6)
ESTIMATED AVERAGE GLUCOSE: 148 MG/DL — HIGH (ref 68–114)
GLUCOSE BLDC GLUCOMTR-MCNC: 128 MG/DL — HIGH (ref 70–99)
GLUCOSE BLDC GLUCOMTR-MCNC: 136 MG/DL — HIGH (ref 70–99)
GLUCOSE BLDC GLUCOMTR-MCNC: 144 MG/DL — HIGH (ref 70–99)
GLUCOSE BLDC GLUCOMTR-MCNC: 172 MG/DL — HIGH (ref 70–99)
GLUCOSE BLDC GLUCOMTR-MCNC: 185 MG/DL — HIGH (ref 70–99)

## 2023-01-10 RX ORDER — DEXTROSE 50 % IN WATER 50 %
25 SYRINGE (ML) INTRAVENOUS ONCE
Refills: 0 | Status: DISCONTINUED | OUTPATIENT
Start: 2023-01-10 | End: 2023-01-11

## 2023-01-10 RX ORDER — INSULIN LISPRO 100/ML
VIAL (ML) SUBCUTANEOUS
Refills: 0 | Status: DISCONTINUED | OUTPATIENT
Start: 2023-01-10 | End: 2023-01-11

## 2023-01-10 RX ORDER — SODIUM CHLORIDE 9 MG/ML
1000 INJECTION, SOLUTION INTRAVENOUS
Refills: 0 | Status: DISCONTINUED | OUTPATIENT
Start: 2023-01-10 | End: 2023-01-10

## 2023-01-10 RX ORDER — DIPHENHYDRAMINE HCL 50 MG
25 CAPSULE ORAL ONCE
Refills: 0 | Status: COMPLETED | OUTPATIENT
Start: 2023-01-10 | End: 2023-01-10

## 2023-01-10 RX ORDER — SODIUM CHLORIDE 9 MG/ML
1000 INJECTION, SOLUTION INTRAVENOUS
Refills: 0 | Status: DISCONTINUED | OUTPATIENT
Start: 2023-01-10 | End: 2023-01-11

## 2023-01-10 RX ORDER — METRONIDAZOLE 500 MG
500 TABLET ORAL EVERY 8 HOURS
Refills: 0 | Status: DISCONTINUED | OUTPATIENT
Start: 2023-01-10 | End: 2023-01-11

## 2023-01-10 RX ORDER — ACETAMINOPHEN 500 MG
650 TABLET ORAL EVERY 6 HOURS
Refills: 0 | Status: DISCONTINUED | OUTPATIENT
Start: 2023-01-10 | End: 2023-01-11

## 2023-01-10 RX ORDER — OXYCODONE AND ACETAMINOPHEN 5; 325 MG/1; MG/1
1 TABLET ORAL EVERY 4 HOURS
Refills: 0 | Status: DISCONTINUED | OUTPATIENT
Start: 2023-01-10 | End: 2023-01-11

## 2023-01-10 RX ORDER — ONDANSETRON 8 MG/1
4 TABLET, FILM COATED ORAL ONCE
Refills: 0 | Status: DISCONTINUED | OUTPATIENT
Start: 2023-01-10 | End: 2023-01-10

## 2023-01-10 RX ORDER — CEFEPIME 1 G/1
1000 INJECTION, POWDER, FOR SOLUTION INTRAMUSCULAR; INTRAVENOUS EVERY 12 HOURS
Refills: 0 | Status: DISCONTINUED | OUTPATIENT
Start: 2023-01-10 | End: 2023-01-11

## 2023-01-10 RX ORDER — METOPROLOL TARTRATE 50 MG
25 TABLET ORAL DAILY
Refills: 0 | Status: DISCONTINUED | OUTPATIENT
Start: 2023-01-10 | End: 2023-01-11

## 2023-01-10 RX ORDER — DEXTROSE MONOHYDRATE, SODIUM CHLORIDE, AND POTASSIUM CHLORIDE 50; .745; 4.5 G/1000ML; G/1000ML; G/1000ML
1000 INJECTION, SOLUTION INTRAVENOUS
Refills: 0 | Status: DISCONTINUED | OUTPATIENT
Start: 2023-01-10 | End: 2023-01-10

## 2023-01-10 RX ORDER — LISINOPRIL 2.5 MG/1
5 TABLET ORAL DAILY
Refills: 0 | Status: DISCONTINUED | OUTPATIENT
Start: 2023-01-10 | End: 2023-01-11

## 2023-01-10 RX ORDER — HEPARIN SODIUM 5000 [USP'U]/ML
5000 INJECTION INTRAVENOUS; SUBCUTANEOUS EVERY 8 HOURS
Refills: 0 | Status: DISCONTINUED | OUTPATIENT
Start: 2023-01-10 | End: 2023-01-11

## 2023-01-10 RX ORDER — BENZOCAINE AND MENTHOL 5; 1 G/100ML; G/100ML
1 LIQUID ORAL
Refills: 0 | Status: DISCONTINUED | OUTPATIENT
Start: 2023-01-10 | End: 2023-01-11

## 2023-01-10 RX ORDER — GLUCAGON INJECTION, SOLUTION 0.5 MG/.1ML
1 INJECTION, SOLUTION SUBCUTANEOUS ONCE
Refills: 0 | Status: DISCONTINUED | OUTPATIENT
Start: 2023-01-10 | End: 2023-01-11

## 2023-01-10 RX ORDER — DEXTROSE 50 % IN WATER 50 %
15 SYRINGE (ML) INTRAVENOUS ONCE
Refills: 0 | Status: DISCONTINUED | OUTPATIENT
Start: 2023-01-10 | End: 2023-01-11

## 2023-01-10 RX ORDER — DEXTROSE 50 % IN WATER 50 %
12.5 SYRINGE (ML) INTRAVENOUS ONCE
Refills: 0 | Status: DISCONTINUED | OUTPATIENT
Start: 2023-01-10 | End: 2023-01-11

## 2023-01-10 RX ORDER — HYDROMORPHONE HYDROCHLORIDE 2 MG/ML
0.5 INJECTION INTRAMUSCULAR; INTRAVENOUS; SUBCUTANEOUS
Refills: 0 | Status: DISCONTINUED | OUTPATIENT
Start: 2023-01-10 | End: 2023-01-10

## 2023-01-10 RX ADMIN — LISINOPRIL 5 MILLIGRAM(S): 2.5 TABLET ORAL at 05:11

## 2023-01-10 RX ADMIN — HYDROMORPHONE HYDROCHLORIDE 0.5 MILLIGRAM(S): 2 INJECTION INTRAMUSCULAR; INTRAVENOUS; SUBCUTANEOUS at 01:34

## 2023-01-10 RX ADMIN — Medication 100 MILLIGRAM(S): at 05:10

## 2023-01-10 RX ADMIN — Medication 650 MILLIGRAM(S): at 17:36

## 2023-01-10 RX ADMIN — CEFEPIME 100 MILLIGRAM(S): 1 INJECTION, POWDER, FOR SOLUTION INTRAMUSCULAR; INTRAVENOUS at 05:10

## 2023-01-10 RX ADMIN — HEPARIN SODIUM 5000 UNIT(S): 5000 INJECTION INTRAVENOUS; SUBCUTANEOUS at 21:48

## 2023-01-10 RX ADMIN — Medication 100 MILLIGRAM(S): at 21:48

## 2023-01-10 RX ADMIN — HEPARIN SODIUM 5000 UNIT(S): 5000 INJECTION INTRAVENOUS; SUBCUTANEOUS at 15:18

## 2023-01-10 RX ADMIN — HEPARIN SODIUM 5000 UNIT(S): 5000 INJECTION INTRAVENOUS; SUBCUTANEOUS at 05:11

## 2023-01-10 RX ADMIN — Medication 25 MILLIGRAM(S): at 05:11

## 2023-01-10 RX ADMIN — Medication 25 MILLIGRAM(S): at 02:26

## 2023-01-10 RX ADMIN — HYDROMORPHONE HYDROCHLORIDE 0.5 MILLIGRAM(S): 2 INJECTION INTRAMUSCULAR; INTRAVENOUS; SUBCUTANEOUS at 01:49

## 2023-01-10 RX ADMIN — Medication 2: at 11:53

## 2023-01-10 RX ADMIN — Medication 650 MILLIGRAM(S): at 20:48

## 2023-01-10 RX ADMIN — CEFEPIME 100 MILLIGRAM(S): 1 INJECTION, POWDER, FOR SOLUTION INTRAMUSCULAR; INTRAVENOUS at 18:24

## 2023-01-10 RX ADMIN — Medication 100 MILLIGRAM(S): at 15:18

## 2023-01-10 RX ADMIN — Medication 2: at 08:12

## 2023-01-10 NOTE — PROGRESS NOTE ADULT - NSPROGADDITIONALINFOA_GEN_ALL_CORE
Pt seen and examined. C/o mild RLQ pain and at suprapubic drain site. Abd- soft, + RLQ tenderness and incisional tenderness, no guarding no rebound. RODNEY serosanguinsous.  Advance diet. Given appearance of appendix at time of surgery and severity of inflammation recommend pt to stay additional day for IV abx. Pt and mother at bedside, agreed with plan.

## 2023-01-10 NOTE — PATIENT PROFILE ADULT - FALL HARM RISK - HARM RISK INTERVENTIONS

## 2023-01-11 ENCOUNTER — TRANSCRIPTION ENCOUNTER (OUTPATIENT)
Age: 53
End: 2023-01-11

## 2023-01-11 VITALS
SYSTOLIC BLOOD PRESSURE: 134 MMHG | TEMPERATURE: 98 F | DIASTOLIC BLOOD PRESSURE: 77 MMHG | RESPIRATION RATE: 16 BRPM | HEART RATE: 70 BPM | OXYGEN SATURATION: 97 %

## 2023-01-11 LAB
A1C WITH ESTIMATED AVERAGE GLUCOSE RESULT: 6.7 % — HIGH (ref 4–5.6)
ALBUMIN SERPL ELPH-MCNC: 2.6 G/DL — LOW (ref 3.5–5)
ALP SERPL-CCNC: 68 U/L — SIGNIFICANT CHANGE UP (ref 40–120)
ALT FLD-CCNC: 27 U/L DA — SIGNIFICANT CHANGE UP (ref 10–60)
ANION GAP SERPL CALC-SCNC: 5 MMOL/L — SIGNIFICANT CHANGE UP (ref 5–17)
AST SERPL-CCNC: 14 U/L — SIGNIFICANT CHANGE UP (ref 10–40)
BILIRUB SERPL-MCNC: 0.3 MG/DL — SIGNIFICANT CHANGE UP (ref 0.2–1.2)
BUN SERPL-MCNC: 8 MG/DL — SIGNIFICANT CHANGE UP (ref 7–18)
CALCIUM SERPL-MCNC: 8.5 MG/DL — SIGNIFICANT CHANGE UP (ref 8.4–10.5)
CHLORIDE SERPL-SCNC: 107 MMOL/L — SIGNIFICANT CHANGE UP (ref 96–108)
CO2 SERPL-SCNC: 29 MMOL/L — SIGNIFICANT CHANGE UP (ref 22–31)
CREAT SERPL-MCNC: 0.77 MG/DL — SIGNIFICANT CHANGE UP (ref 0.5–1.3)
CULTURE RESULTS: SIGNIFICANT CHANGE UP
EGFR: 94 ML/MIN/1.73M2 — SIGNIFICANT CHANGE UP
ESTIMATED AVERAGE GLUCOSE: 146 MG/DL — HIGH (ref 68–114)
GLUCOSE BLDC GLUCOMTR-MCNC: 123 MG/DL — HIGH (ref 70–99)
GLUCOSE BLDC GLUCOMTR-MCNC: 124 MG/DL — HIGH (ref 70–99)
GLUCOSE BLDC GLUCOMTR-MCNC: 137 MG/DL — HIGH (ref 70–99)
GLUCOSE SERPL-MCNC: 153 MG/DL — HIGH (ref 70–99)
HCT VFR BLD CALC: 34.9 % — SIGNIFICANT CHANGE UP (ref 34.5–45)
HGB BLD-MCNC: 11.8 G/DL — SIGNIFICANT CHANGE UP (ref 11.5–15.5)
MCHC RBC-ENTMCNC: 31.1 PG — SIGNIFICANT CHANGE UP (ref 27–34)
MCHC RBC-ENTMCNC: 33.8 GM/DL — SIGNIFICANT CHANGE UP (ref 32–36)
MCV RBC AUTO: 92.1 FL — SIGNIFICANT CHANGE UP (ref 80–100)
NRBC # BLD: 0 /100 WBCS — SIGNIFICANT CHANGE UP (ref 0–0)
PLATELET # BLD AUTO: 261 K/UL — SIGNIFICANT CHANGE UP (ref 150–400)
POTASSIUM SERPL-MCNC: 4.2 MMOL/L — SIGNIFICANT CHANGE UP (ref 3.5–5.3)
POTASSIUM SERPL-SCNC: 4.2 MMOL/L — SIGNIFICANT CHANGE UP (ref 3.5–5.3)
PROT SERPL-MCNC: 6.2 G/DL — SIGNIFICANT CHANGE UP (ref 6–8.3)
RBC # BLD: 3.79 M/UL — LOW (ref 3.8–5.2)
RBC # FLD: 12.7 % — SIGNIFICANT CHANGE UP (ref 10.3–14.5)
SODIUM SERPL-SCNC: 141 MMOL/L — SIGNIFICANT CHANGE UP (ref 135–145)
SPECIMEN SOURCE: SIGNIFICANT CHANGE UP
WBC # BLD: 6.13 K/UL — SIGNIFICANT CHANGE UP (ref 3.8–10.5)
WBC # FLD AUTO: 6.13 K/UL — SIGNIFICANT CHANGE UP (ref 3.8–10.5)

## 2023-01-11 PROCEDURE — 36415 COLL VENOUS BLD VENIPUNCTURE: CPT

## 2023-01-11 PROCEDURE — 84702 CHORIONIC GONADOTROPIN TEST: CPT

## 2023-01-11 PROCEDURE — 85730 THROMBOPLASTIN TIME PARTIAL: CPT

## 2023-01-11 PROCEDURE — 88304 TISSUE EXAM BY PATHOLOGIST: CPT

## 2023-01-11 PROCEDURE — 83036 HEMOGLOBIN GLYCOSYLATED A1C: CPT

## 2023-01-11 PROCEDURE — 81001 URINALYSIS AUTO W/SCOPE: CPT

## 2023-01-11 PROCEDURE — 85025 COMPLETE CBC W/AUTO DIFF WBC: CPT

## 2023-01-11 PROCEDURE — 99285 EMERGENCY DEPT VISIT HI MDM: CPT | Mod: 25

## 2023-01-11 PROCEDURE — 86900 BLOOD TYPING SEROLOGIC ABO: CPT

## 2023-01-11 PROCEDURE — C9399: CPT

## 2023-01-11 PROCEDURE — 87086 URINE CULTURE/COLONY COUNT: CPT

## 2023-01-11 PROCEDURE — 85610 PROTHROMBIN TIME: CPT

## 2023-01-11 PROCEDURE — 86901 BLOOD TYPING SEROLOGIC RH(D): CPT

## 2023-01-11 PROCEDURE — 74177 CT ABD & PELVIS W/CONTRAST: CPT | Mod: MA

## 2023-01-11 PROCEDURE — 86850 RBC ANTIBODY SCREEN: CPT

## 2023-01-11 PROCEDURE — 85027 COMPLETE CBC AUTOMATED: CPT

## 2023-01-11 PROCEDURE — 87635 SARS-COV-2 COVID-19 AMP PRB: CPT

## 2023-01-11 PROCEDURE — C1889: CPT

## 2023-01-11 PROCEDURE — 80053 COMPREHEN METABOLIC PANEL: CPT

## 2023-01-11 PROCEDURE — 83690 ASSAY OF LIPASE: CPT

## 2023-01-11 PROCEDURE — 82962 GLUCOSE BLOOD TEST: CPT

## 2023-01-11 RX ORDER — METRONIDAZOLE 500 MG
1 TABLET ORAL
Qty: 21 | Refills: 0
Start: 2023-01-11 | End: 2023-01-17

## 2023-01-11 RX ORDER — LISINOPRIL 2.5 MG/1
0 TABLET ORAL
Qty: 0 | Refills: 2 | DISCHARGE

## 2023-01-11 RX ORDER — CIPROFLOXACIN LACTATE 400MG/40ML
1 VIAL (ML) INTRAVENOUS
Qty: 14 | Refills: 0
Start: 2023-01-11 | End: 2023-01-17

## 2023-01-11 RX ORDER — LISINOPRIL 2.5 MG/1
1 TABLET ORAL
Qty: 0 | Refills: 0 | DISCHARGE
Start: 2023-01-11

## 2023-01-11 RX ADMIN — Medication 650 MILLIGRAM(S): at 06:36

## 2023-01-11 RX ADMIN — Medication 25 MILLIGRAM(S): at 05:20

## 2023-01-11 RX ADMIN — LISINOPRIL 5 MILLIGRAM(S): 2.5 TABLET ORAL at 05:20

## 2023-01-11 RX ADMIN — HEPARIN SODIUM 5000 UNIT(S): 5000 INJECTION INTRAVENOUS; SUBCUTANEOUS at 13:10

## 2023-01-11 RX ADMIN — Medication 100 MILLIGRAM(S): at 13:10

## 2023-01-11 RX ADMIN — Medication 100 MILLIGRAM(S): at 05:20

## 2023-01-11 RX ADMIN — HEPARIN SODIUM 5000 UNIT(S): 5000 INJECTION INTRAVENOUS; SUBCUTANEOUS at 05:19

## 2023-01-11 RX ADMIN — Medication 650 MILLIGRAM(S): at 05:19

## 2023-01-11 RX ADMIN — CEFEPIME 100 MILLIGRAM(S): 1 INJECTION, POWDER, FOR SOLUTION INTRAMUSCULAR; INTRAVENOUS at 17:48

## 2023-01-11 RX ADMIN — CEFEPIME 100 MILLIGRAM(S): 1 INJECTION, POWDER, FOR SOLUTION INTRAMUSCULAR; INTRAVENOUS at 05:20

## 2023-01-11 NOTE — DISCHARGE NOTE PROVIDER - HOSPITAL COURSE
51 y/o female with PMHx of HTN, DM, HLD,  PSHx of hysterectomy this summer for heavy bleeding and fibroids, presents to ED on 1/9 w/ 3 days of right lower abdominal pain, vomiting, and bloating. Described as an 8/10 on pain scale , sharp and persistent. She went to her doctor who sent her to the ER for CAT scan.  CT findings consistent with appendicitis without evidence of perforation or abscess. Medicine was consulted and cleared patient for OR.   Patient underwent laparoscopic appendectomy on 1/10. The procedure was well tolerated by the patient. RODNEY was placed intraop and removed on 1/11. The post-operative course was uncomplicated. Diet was advanced as tolerated and according to bowel function, and pain was well controlled on medication. At the time of discharge, the patient was hemodynamically stable, was tolerating PO diet, was voiding urine and passing gas, was ambulating, and was comfortable with adequate pain control. The patient was instructed to follow up with Dr. Vincent within 1-2 weeks after discharge from the hospital.

## 2023-01-11 NOTE — DISCHARGE NOTE PROVIDER - NSDCFUSCHEDAPPT_GEN_ALL_CORE_FT
[facility]  [facility] [address] [facility]  Scheduled Appointment: 03/21/2023

## 2023-01-11 NOTE — DISCHARGE NOTE NURSING/CASE MANAGEMENT/SOCIAL WORK - NSFLUVACAGEDISCH_IMM_ALL_CORE
Adult [Routine Follow-Up] : a routine follow-up visit for [Other: _____] : [unfilled] [FreeTextEntry2] : allergic rhinitis, drug allergy, pineapple intolerance and contact dermatitis

## 2023-01-11 NOTE — PROGRESS NOTE ADULT - ASSESSMENT
s/p lap appy  post op stable    clears, advance diet as tolerated  oob  analgesia as needed
52y.o. Female s/p lap appy POD#1    -Diet as tolerated  -Pain control prn  -OOB ambulate  -RODNEY drain care  -Abx    HTN  con't Metoprolol    DM  -ISS while inhouse  
s/p lap appy mickey per surgery team  post op stable    clears, advance diet as tolerated  oob  analgesia as needed    htn stable on meds

## 2023-01-11 NOTE — DISCHARGE NOTE NURSING/CASE MANAGEMENT/SOCIAL WORK - PATIENT PORTAL LINK FT
You can access the FollowMyHealth Patient Portal offered by [facility] by registering at the following website: [email or web address]. By joining [facility]’s FollowMyHealth portal, you will also be able to view your health information using other applications (apps) compatible with our system.

## 2023-01-11 NOTE — DISCHARGE NOTE PROVIDER - CARE PROVIDER_API CALL
Marcie Vincent)  Surgery  [address] [facility] [address]  [place] [address]  Phone: [phone]  Fax: [phone]  Follow Up Time: 2 weeks

## 2023-01-11 NOTE — DISCHARGE NOTE PROVIDER - ATTENDING DISCHARGE PHYSICAL EXAMINATION:
Pt seen and examined.  Tolerating diet, passing flatus. WBC 6.   Abd- soft, nondistended, mild incisional tenderness, mostly LLQ. RODNEY serosanguinous, was removed. No guarding no rebound.  Pain controlled with oral pain meds. Stable for discharge to home, will continue on po cipro/flagyl for one week.  Mother at bedside. Went over discharge instructions and signs and symptoms to call for. Will f/u in clinic in one week.  Pt expressed understanding.

## 2023-01-11 NOTE — DISCHARGE NOTE PROVIDER - NSDCCPTREATMENT_GEN_ALL_CORE_FT
PRINCIPAL PROCEDURE  Procedure: Lap appendectomy  Findings and Treatment: Please follow-up with your surgeon in 1 week. Drink plenty of fluids and rest as needed. Call for any fever over 101, nausea, vomiting, severe pain, no passing of gas or bowel movement.   DIET  You may resume your regular diet as normal.   SURGICAL SITES  Remove outer dressing and keep white steri-strips in place allowing them to fall off on their own. You may shower 48 hours post-operatively but do not bathe or soak in the water for 1-2 weeks; pat dry. If you notice any signs of surgical site infection (ie. redness, swelling, pain, pus drainage), please seek medical care immediately.  Abdominal Binder: please keep abdominal binder on at all times except for when sleeping or taking a shower.   Please continue to use until follow up appointment with surgeon.   abx  take abx as prescribed  do not drink while taking abx   ACTIVITY  Do not lift anything heavier than 10 pounds for 2 weeks  and avoid strenuous activity for 4-6 weeks.   PAIN CONTROL  You may take Motrin 600mg (with food) or Tylenol 650mg as needed for mild pain. Stagger one medication 3 hours after the other for maximum pain control. Maximum daily dose of Tylenol should not exceed 4grams/day.   Please refer to medical records/ progress notes for in depth hospital course. Discharge plan discussed and agreed with attending.

## 2023-01-11 NOTE — PROGRESS NOTE ADULT - SUBJECTIVE AND OBJECTIVE BOX
INTERVAL HPI/OVERNIGHT EVENTS:  Pt resting comfortably. c/o incisional pain.  Tolerating diet.   -flatus/BM.   Denies N/V    MEDICATIONS  (STANDING):  cefepime   IVPB 1000 milliGRAM(s) IV Intermittent every 12 hours  dextrose 5%. 1000 milliLiter(s) (100 mL/Hr) IV Continuous <Continuous>  dextrose 5%. 1000 milliLiter(s) (50 mL/Hr) IV Continuous <Continuous>  dextrose 50% Injectable 25 Gram(s) IV Push once  dextrose 50% Injectable 12.5 Gram(s) IV Push once  dextrose 50% Injectable 25 Gram(s) IV Push once  glucagon  Injectable 1 milliGRAM(s) IntraMuscular once  heparin   Injectable 5000 Unit(s) SubCutaneous every 8 hours  insulin lispro (ADMELOG) corrective regimen sliding scale   SubCutaneous three times a day before meals  lisinopril 5 milliGRAM(s) Oral daily  metoprolol succinate ER 25 milliGRAM(s) Oral daily  metroNIDAZOLE  IVPB 500 milliGRAM(s) IV Intermittent every 8 hours    MEDICATIONS  (PRN):  acetaminophen     Tablet .. 650 milliGRAM(s) Oral every 6 hours PRN Temp greater or equal to 38.5C (101.3F), Mild Pain (1 - 3)  benzocaine 15 mG/menthol 3.6 mG Lozenge 1 Lozenge Oral every 2 hours PRN Sore Throat  dextrose Oral Gel 15 Gram(s) Oral once PRN Blood Glucose LESS THAN 70 milliGRAM(s)/deciliter  oxycodone    5 mG/acetaminophen 325 mG 1 Tablet(s) Oral every 4 hours PRN Moderate Pain (4 - 6)    Vital Signs Last 24 Hrs  T(C): 36.9 (11 Jan 2023 05:45), Max: 37.3 (10 Kyle 2023 20:53)  T(F): 98.5 (11 Jan 2023 05:45), Max: 99.2 (10 Kyle 2023 20:53)  HR: 73 (11 Jan 2023 05:45) (73 - 81)  BP: 162/87 (11 Jan 2023 05:45) (150/73 - 162/87)  BP(mean): --  RR: 18 (11 Jan 2023 05:45) (17 - 18)  SpO2: 95% (11 Jan 2023 05:45) (95% - 98%)    Parameters below as of 11 Jan 2023 05:45  Patient On (Oxygen Delivery Method): room air    Physical:  General: A&Ox3. NAD.  Abdomen: Soft nondistended, Dressing C/D/I.    I&O's Detail    10 Kyle 2023 07:01  -  11 Jan 2023 07:00  --------------------------------------------------------  IN:  Total IN: 0 mL    OUT:    Bulb (mL): 95 mL serosanguinous  Total OUT: 95 mL    Total NET: -95 mL    LABS:                        11.8   6.13  )-----------( 261      ( 11 Jan 2023 07:08 )             34.9             01-09    138  |  106  |  7   ----------------------------<  105<H>  4.0   |  27  |  0.67    Ca    9.4      09 Jan 2023 16:30    TPro  7.4  /  Alb  3.4<L>  /  TBili  0.4  /  DBili  x   /  AST  16  /  ALT  33  /  AlkPhos  83  01-09    
POST-OPERATIVE NOTE    Subjective:   52y Female s/p lap appendectomy POD #0. Pain is well controlled. Denies any other complaints. . Voiding     Vital Signs Last 24 Hrs  T(C): 36.8 (10 Kyle 2023 04:27), Max: 36.8 (09 Jan 2023 20:10)  T(F): 98.2 (10 Kyle 2023 04:27), Max: 98.2 (09 Jan 2023 20:10)  HR: 82 (10 Kyle 2023 04:27) (68 - 91)  BP: 134/72 (10 Kyle 2023 04:27) (122/73 - 164/88)  BP(mean): 85 (10 Kyle 2023 03:19) (85 - 103)  RR: 17 (10 Kyle 2023 04:27) (12 - 20)  SpO2: 97% (10 Kyle 2023 04:27) (95% - 100%)    Parameters below as of 10 Kyle 2023 04:27  Patient On (Oxygen Delivery Method): nasal cannula  O2 Flow (L/min): 3    I&O's Detail    09 Jan 2023 07:01  -  10 Kyle 2023 06:25  --------------------------------------------------------  IN:    dextrose 5% + sodium chloride 0.9% w/ Additives: 200 mL    Lactated Ringers Bolus: 1000 mL  Total IN: 1200 mL    OUT:    Bulb (mL): 65 mL    Voided (mL): 800 mL  Total OUT: 865 mL    Total NET: 335 mL          Physical Exam:  General: NAD, comfortable  Pulmonary: Nonlabored breathing, no respiratory distress  Cardiovascular: NSR, S1, S2  Abdominal: soft, NT/ND, dressing c/d/i  Extremities: no edema, no calf tenderness, distal pulses are palpable     LABS:                        13.2   9.46  )-----------( 289      ( 09 Jan 2023 16:30 )             38.3     01-09    138  |  106  |  7   ----------------------------<  105<H>  4.0   |  27  |  0.67    Ca    9.4      09 Jan 2023 16:30    TPro  7.4  /  Alb  3.4<L>  /  TBili  0.4  /  DBili  x   /  AST  16  /  ALT  33  /  AlkPhos  83  01-09    LIVER FUNCTIONS - ( 09 Jan 2023 16:30 )  Alb: 3.4 g/dL / Pro: 7.4 g/dL / ALK PHOS: 83 U/L / ALT: 33 U/L DA / AST: 16 U/L / GGT: x             MEDICATIONS  (STANDING):  cefepime   IVPB 1000 milliGRAM(s) IV Intermittent every 12 hours  dextrose 5% + sodium chloride 0.9% with potassium chloride 20 mEq/L 1000 milliLiter(s) (100 mL/Hr) IV Continuous <Continuous>  dextrose 5%. 1000 milliLiter(s) (100 mL/Hr) IV Continuous <Continuous>  dextrose 5%. 1000 milliLiter(s) (50 mL/Hr) IV Continuous <Continuous>  dextrose 50% Injectable 25 Gram(s) IV Push once  dextrose 50% Injectable 12.5 Gram(s) IV Push once  dextrose 50% Injectable 25 Gram(s) IV Push once  glucagon  Injectable 1 milliGRAM(s) IntraMuscular once  heparin   Injectable 5000 Unit(s) SubCutaneous every 8 hours  insulin lispro (ADMELOG) corrective regimen sliding scale   SubCutaneous three times a day before meals  lisinopril 5 milliGRAM(s) Oral daily  metoprolol succinate ER 25 milliGRAM(s) Oral daily  metroNIDAZOLE  IVPB 500 milliGRAM(s) IV Intermittent every 8 hours    MEDICATIONS  (PRN):  dextrose Oral Gel 15 Gram(s) Oral once PRN Blood Glucose LESS THAN 70 milliGRAM(s)/deciliter  oxycodone    5 mG/acetaminophen 325 mG 1 Tablet(s) Oral every 4 hours PRN Moderate Pain (4 - 6)      Assessment:  The patient is a 52y Female who is s/p .lap appendectomy POD #0 Stable    Plan:  - Pain control as needed  -Resume diet, Adv diet as tolerated  -Dressing change prn  -Incentive spirometer  - OOB and ambulating as tolerated  - F/u AM labs  - DVT ppx
Pt with blood in saliva this am. no hematemesis, no sob, no cp, some sore throat.  vss  no stridor  no evidence of active brisk bleed.  reassured pt that likely related to ET intubation and likely resolve.  Cepachol ordered but not available. pt will drink liquids to sooth throat
Patient was seen and examined  Patient is a 52y old  Female who presents with a chief complaint of   post up day 1  doing well   INTERVAL HPI/OVERNIGHT EVENTS:  T(C): 36.8 (01-10-23 @ 13:41), Max: 36.8 (23 @ 20:10)  HR: 74 (01-10-23 @ 13:41) (68 - 91)  BP: 157/78 (01-10-23 @ 13:41) (122/73 - 164/88)  RR: 17 (01-10-23 @ 13:41) (12 - 20)  SpO2: 97% (01-10-23 @ 13:41) (95% - 100%)  Wt(kg): --  I&O's Summary    2023 07:01  -  10 Kyle 2023 07:00  --------------------------------------------------------  IN: 1200 mL / OUT: 865 mL / NET: 335 mL    10 Kyle 2023 07:01  -  10 Kyle 2023 19:19  --------------------------------------------------------  IN: 0 mL / OUT: 30 mL / NET: -30 mL        LABS:                        13.2   9.46  )-----------( 289      ( 2023 16:30 )             38.3         138  |  106  |  7   ----------------------------<  105<H>  4.0   |  27  |  0.67    Ca    9.4      2023 16:30    TPro  7.4  /  Alb  3.4<L>  /  TBili  0.4  /  DBili  x   /  AST  16  /  ALT  33  /  AlkPhos  83  -    PT/INR - ( 2023 19:30 )   PT: 13.9 sec;   INR: 1.17 ratio         PTT - ( 2023 19:30 )  PTT:29.4 sec  Urinalysis Basic - ( 2023 18:59 )    Color: Yellow / Appearance: Clear / S.025 / pH: x  Gluc: x / Ketone: Moderate  / Bili: Negative / Urobili: Negative   Blood: x / Protein: 15 / Nitrite: Negative   Leuk Esterase: Negative / RBC: 0-2 /HPF / WBC 3-5 /HPF   Sq Epi: x / Non Sq Epi: Few /HPF / Bacteria: Many /HPF      CAPILLARY BLOOD GLUCOSE      POCT Blood Glucose.: 128 mg/dL (10 Kyle 2023 16:35)  POCT Blood Glucose.: 185 mg/dL (10 Kyle 2023 11:35)  POCT Blood Glucose.: 172 mg/dL (10 Kyle 2023 07:59)  POCT Blood Glucose.: 144 mg/dL (10 Kyle 2023 01:22)          Urinalysis Basic - ( 2023 18:59 )    Color: Yellow / Appearance: Clear / S.025 / pH: x  Gluc: x / Ketone: Moderate  / Bili: Negative / Urobili: Negative   Blood: x / Protein: 15 / Nitrite: Negative   Leuk Esterase: Negative / RBC: 0-2 /HPF / WBC 3-5 /HPF   Sq Epi: x / Non Sq Epi: Few /HPF / Bacteria: Many /HPF        MEDICATIONS  (STANDING):  cefepime   IVPB 1000 milliGRAM(s) IV Intermittent every 12 hours  dextrose 5%. 1000 milliLiter(s) (100 mL/Hr) IV Continuous <Continuous>  dextrose 5%. 1000 milliLiter(s) (50 mL/Hr) IV Continuous <Continuous>  dextrose 50% Injectable 25 Gram(s) IV Push once  dextrose 50% Injectable 12.5 Gram(s) IV Push once  dextrose 50% Injectable 25 Gram(s) IV Push once  glucagon  Injectable 1 milliGRAM(s) IntraMuscular once  heparin   Injectable 5000 Unit(s) SubCutaneous every 8 hours  insulin lispro (ADMELOG) corrective regimen sliding scale   SubCutaneous three times a day before meals  lisinopril 5 milliGRAM(s) Oral daily  metoprolol succinate ER 25 milliGRAM(s) Oral daily  metroNIDAZOLE  IVPB 500 milliGRAM(s) IV Intermittent every 8 hours    MEDICATIONS  (PRN):  acetaminophen     Tablet .. 650 milliGRAM(s) Oral every 6 hours PRN Temp greater or equal to 38.5C (101.3F), Mild Pain (1 - 3)  benzocaine 15 mG/menthol 3.6 mG Lozenge 1 Lozenge Oral every 2 hours PRN Sore Throat  dextrose Oral Gel 15 Gram(s) Oral once PRN Blood Glucose LESS THAN 70 milliGRAM(s)/deciliter  oxycodone    5 mG/acetaminophen 325 mG 1 Tablet(s) Oral every 4 hours PRN Moderate Pain (4 - 6)      RADIOLOGY & ADDITIONAL TESTS:    Imaging Personally Reviewed:  [ ] YES  [ ] NO    REVIEW OF SYSTEMS:  CONSTITUTIONAL: No fever, weight loss, or fatigue  EYES: No eye pain, visual disturbances, or discharge  ENMT:  No difficulty hearing, tinnitus, vertigo; No sinus or throat pain  NECK: No pain or stiffness  BREASTS: No pain, masses, or nipple discharge  RESPIRATORY: No cough, wheezing, chills or hemoptysis; No shortness of breath  CARDIOVASCULAR: No chest pain, palpitations, dizziness, or leg swelling  GASTROINTESTINAL: No abdominal or epigastric pain. No nausea, vomiting, or hematemesis; No diarrhea or constipation. No melena or hematochezia.  GENITOURINARY: No dysuria, frequency, hematuria, or incontinence  NEUROLOGICAL: No headaches, memory loss, loss of strength, numbness, or tremors  SKIN: No itching, burning, rashes, or lesions   LYMPH NODES: No enlarged glands  ENDOCRINE: No heat or cold intolerance; No hair loss  MUSCULOSKELETAL: No joint pain or swelling; No muscle, back, or extremity pain  PSYCHIATRIC: No depression, anxiety, mood swings, or difficulty sleeping  HEME/LYMPH: No easy bruising, or bleeding gums  ALLERY AND IMMUNOLOGIC: No hives or eczema      Consultant(s) Notes Reviewed:  [ ] YES  [ ] NO    PHYSICAL EXAM:  GENERAL: NAD, well-groomed, well-developed  HEAD:  Atraumatic, Normocephalic  EYES: EOMI, PERRLA, conjunctiva and sclera clear  ENMT: No tonsillar erythema, exudates, or enlargement; Moist mucous membranes, Good dentition, No lesions  NECK: Supple, No JVD, Normal thyroid  NERVOUS SYSTEM:  Alert & Oriented X3, Good concentration; Motor Strength 5/5 B/L upper and lower extremities; DTRs 2+ intact and symmetric  CHEST/LUNG: Clear to percussion bilaterally; No rales, rhonchi, wheezing, or rubs  abd soft dressing   HEART: s1 s2   EXTREMITIES:  2+ Peripheral Pulses, No clubbing, cyanosis, or edema  LYMPH: No lymphadenopathy noted  SKIN: No rashes or lesions    Care Discussed with Consultants/Other Providers [ x] YES  [ ] NO
Pt s- new complaints. no n/v no cp no sob  ICU Vital Signs Last 24 Hrs  T(C): 36.8 (10 Kyle 2023 04:27), Max: 36.8 (09 Jan 2023 20:10)  T(F): 98.2 (10 Kyle 2023 04:27), Max: 98.2 (09 Jan 2023 20:10)  HR: 82 (10 Kyle 2023 04:27) (68 - 91)  BP: 134/72 (10 Kyle 2023 04:27) (122/73 - 164/88)  BP(mean): 85 (10 Kyle 2023 03:19) (85 - 103)  ABP: --  ABP(mean): --  RR: 17 (10 Kyle 2023 04:27) (12 - 20)  SpO2: 97% (10 Kyle 2023 04:27) (95% - 100%)    O2 Parameters below as of 10 Kyle 2023 04:27  Patient On (Oxygen Delivery Method): nasal cannula  O2 Flow (L/min): 3      Alert nad  Abd: soft mild incisional tenderness. RODNEY minimal serosanguinous drainage  no calf swelling or erythema

## 2023-01-11 NOTE — DISCHARGE NOTE PROVIDER - NSDCMRMEDTOKEN_GEN_ALL_CORE_FT
Alogliptin 25 mg oral tablet: 1 tab(s) orally once a day- IN AM  aspirin 81 mg oral tablet: 1 tab(s) orally once a day- IN AM  atorvastatin 20 mg oral tablet: 1 tab(s) orally once a day (at bedtime)  Cipro 500 mg oral tablet: 1 tab(s) orally 2 times a day   ibuprofen 200 mg oral capsule: Starting the day after surgery, you SHOULD use 600 mg of ibuprofen (usually 3 over-the-counter tablets) THREE TIMES PER DAY for THREE DAYS....WHETHER YOU FEEL YOU NEED THEM OR NOT!.  IN ADDITION TO ibuprofen, you can use 1-2 Percocet (oxycodone) every 4-6 hours as needed for pain.   lisinopril 5 mg oral tablet: 1 tab(s) orally once a day  metFORMIN 850 mg oral tablet: 1 tab(s) orally 3 times a day (with meals)  metoprolol succinate 25 mg oral tablet, extended release: 1 tab(s) orally once a day- IN AM  metroNIDAZOLE 500 mg oral tablet: 1 tab(s) orally 3 times a day   Percocet 5/325: 1 tab(s) orally every 6 hours, IN ADDITION TO IBUPROFEN, if needed

## 2023-01-11 NOTE — PROGRESS NOTE ADULT - NS ATTEND AMEND GEN_ALL_CORE FT
Pt seen and examined. Reports pain at RODNEY drain site and LLQ incision where has fascial suture. Tolerating diet, passing flatus. WBC 6.   Abd- soft, nondistended, mild incisional tenderness, mostly LLQ. RODNEY serosanguinous. No guarding no rebound.  Drain removed by me. RN and myself helped pt to don abdominal binder.  Pt reports feels better after drain removed. Pain controlled with oral pain meds. Stable for discharge to home. Mother at bedside.

## 2023-01-11 NOTE — DISCHARGE NOTE PROVIDER - NSDCFUADDAPPT_GEN_ALL_CORE_FT
please call to schedule follow up appointment in 2 weeks    please follow up with OBGYN for CT results

## 2023-01-12 PROBLEM — D21.9 BENIGN NEOPLASM OF CONNECTIVE AND OTHER SOFT TISSUE, UNSPECIFIED: Chronic | Status: ACTIVE | Noted: 2023-01-09

## 2023-01-12 PROBLEM — Z87.42 PERSONAL HISTORY OF OTHER DISEASES OF THE FEMALE GENITAL TRACT: Chronic | Status: ACTIVE | Noted: 2023-01-09

## 2023-01-13 LAB — SURGICAL PATHOLOGY STUDY: SIGNIFICANT CHANGE UP

## 2023-01-18 ENCOUNTER — APPOINTMENT (OUTPATIENT)
Dept: SURGERY | Facility: CLINIC | Age: 53
End: 2023-01-18
Payer: MEDICAID

## 2023-01-18 VITALS
WEIGHT: 240 LBS | BODY MASS INDEX: 35.55 KG/M2 | HEART RATE: 67 BPM | DIASTOLIC BLOOD PRESSURE: 85 MMHG | TEMPERATURE: 97.2 F | HEIGHT: 69 IN | OXYGEN SATURATION: 97 % | SYSTOLIC BLOOD PRESSURE: 128 MMHG

## 2023-01-18 DIAGNOSIS — I10 ESSENTIAL (PRIMARY) HYPERTENSION: ICD-10-CM

## 2023-01-18 PROCEDURE — 99024 POSTOP FOLLOW-UP VISIT: CPT

## 2023-01-18 RX ORDER — ASPIRIN 325 MG/1
TABLET, FILM COATED ORAL
Refills: 0 | Status: ACTIVE | COMMUNITY

## 2023-01-18 RX ORDER — CHROMIUM 200 MCG
TABLET ORAL
Refills: 0 | Status: ACTIVE | COMMUNITY

## 2023-01-18 NOTE — HISTORY OF PRESENT ILLNESS
[de-identified] : MIKE JOHNSON is a 52 year old female with PMHX of of HTN, DM, HLD,  PSHx of hysterectomy this summer for heavy bleeding and fibroids who presents in the office for postop visit. Patient was admitted to [facility] with Acute Appendicitis. She underwent a Laparoscopic appendectomy on 01/09/2023 pathology results are consistent with Gangrenous appendicitis and periappendicitis. Today patient is doing well, c/o incisional pain and stated that her last Urine this morning was dark probably due to not drinking enough fluids . Pt saw her PCP Dr. Corey recently for a follow-up as well. She reports he stated she was doing fine. Denies any fevers, chills, nausea, vomiting, diarrhea or constipation. Patient able to tolerate regular diet with normal bowel movements. Surgical incisions are healing well. No drainage from suprapubic incision drain site. No sign of inflammation or exudate. \par

## 2023-01-18 NOTE — PLAN
[FreeTextEntry1] : Please follow up at the office in 1 month and as needed for any questions or concerns

## 2023-01-18 NOTE — PHYSICAL EXAM
[Normal Breath Sounds] : Normal breath sounds [Normal Heart Sounds] : normal heart sounds [Normal Rate and Rhythm] : normal rate and rhythm [Alert] : alert [Oriented to Person] : oriented to person [Oriented to Place] : oriented to place [Oriented to Time] : oriented to time [Calm] : calm [de-identified] : The patient is alert, well-groomed  [de-identified] : Breath sounds equal and bilateral, no wheezing no rales or rhonchi  [de-identified] : Incision sites are healing well, no signs of infection. No trocar site hernia with palpation to LLQ incision with mild tenderness here [de-identified] : full range of motion and no deformities appreciated.  [de-identified] : Incision sites are healing well

## 2023-01-18 NOTE — CONSULT LETTER
[Dear  ___] : Dear  [unfilled], [Courtesy Letter:] : I had the pleasure of seeing your patient, [unfilled], in my office today. [Consult Closing:] : Thank you very much for allowing me to participate in the care of this patient.  If you have any questions, please do not hesitate to contact me. [Sincerely,] : Sincerely, [FreeTextEntry1] : She is healing up as expected 9 days status post appendectomy for gangrenous appendicitis. She will return to see me in 4 weeks for a follow up visit.  [FreeTextEntry3] : Marcie Vincent MD FACS

## 2023-02-22 ENCOUNTER — APPOINTMENT (OUTPATIENT)
Dept: SURGERY | Facility: CLINIC | Age: 53
End: 2023-02-22
Payer: MEDICAID

## 2023-02-22 VITALS
BODY MASS INDEX: 35.4 KG/M2 | SYSTOLIC BLOOD PRESSURE: 140 MMHG | DIASTOLIC BLOOD PRESSURE: 79 MMHG | HEART RATE: 71 BPM | TEMPERATURE: 98.1 F | OXYGEN SATURATION: 97 % | WEIGHT: 239 LBS | HEIGHT: 69 IN

## 2023-02-22 DIAGNOSIS — Z90.49 ACQUIRED ABSENCE OF OTHER SPECIFIED PARTS OF DIGESTIVE TRACT: ICD-10-CM

## 2023-02-22 PROCEDURE — 99024 POSTOP FOLLOW-UP VISIT: CPT

## 2023-02-22 NOTE — ASSESSMENT
[FreeTextEntry1] : MIKE JOHNSON is a 52 year old female who underwent a Laparoscopic appendectomy on 01/09/2023 pathology results are consistent with Gangrenous appendicitis and periappendicitis.\par \par \par Patient is doing well, denies any discomfort.\par \par Patient's questions and concerns addressed to patient's satisfaction.

## 2023-02-22 NOTE — PHYSICAL EXAM
[Normal Breath Sounds] : Normal breath sounds [Normal Heart Sounds] : normal heart sounds [Normal Rate and Rhythm] : normal rate and rhythm [Alert] : alert [Oriented to Person] : oriented to person [Oriented to Place] : oriented to place [Oriented to Time] : oriented to time [Calm] : calm [de-identified] : The patient is alert, well-groomed  [de-identified] : Breath sounds equal and bilateral, no wheezing no rales or rhonchi  [de-identified] : Incision sites are healing well, soft NT, ND, no guarding no rebound [de-identified] : full range of motion and no deformities appreciated.  [de-identified] : Incision sites are healed well

## 2023-02-22 NOTE — HISTORY OF PRESENT ILLNESS
[de-identified] : MIKE JOHNSON is a 52 year old female with PMHX of of HTN, DM, HLD, PSHx of hysterectomy this summer for heavy bleeding and fibroids who presents in the office for postop visit. She underwent a Laparoscopic appendectomy on 01/09/2023 Today patient is doing well, offers no complaints. Denies any fevers, chills, nausea, vomiting, diarrhea or constipation. Patient able to tolerate regular diet with normal bowel movements. Surgical incisions are healed well. No sign of inflammation or exudate. She reports feeling much better since last visit.

## 2023-02-22 NOTE — PLAN
[FreeTextEntry1] : Patient will follow up if needed. Warning signs, follow up, and restrictions were discussed with the patient.

## 2023-03-14 PROBLEM — N63.0 BREAST MASS: Status: ACTIVE | Noted: 2022-11-02

## 2023-03-21 ENCOUNTER — APPOINTMENT (OUTPATIENT)
Dept: SURGICAL ONCOLOGY | Facility: CLINIC | Age: 53
End: 2023-03-21
Payer: MEDICAID

## 2023-03-21 VITALS
HEART RATE: 76 BPM | HEIGHT: 69 IN | SYSTOLIC BLOOD PRESSURE: 144 MMHG | WEIGHT: 242 LBS | BODY MASS INDEX: 35.84 KG/M2 | DIASTOLIC BLOOD PRESSURE: 88 MMHG | TEMPERATURE: 97.2 F

## 2023-03-21 DIAGNOSIS — N63.0 UNSPECIFIED LUMP IN UNSPECIFIED BREAST: ICD-10-CM

## 2023-03-21 PROCEDURE — 99213 OFFICE O/P EST LOW 20 MIN: CPT

## 2023-03-21 NOTE — CONSULT LETTER
[Dear  ___] : Dear  [unfilled], [Courtesy Letter:] : I had the pleasure of seeing your patient, [unfilled], in my office today. [Consult Closing:] : Thank you very much for allowing me to participate in the care of this patient.  If you have any questions, please do not hesitate to contact me. [Please see my note below.] : Please see my note below. [Sincerely,] : Sincerely, [FreeTextEntry3] : Rin Kelly MD\par Breast Surgeon\par Division of Surgical Oncology\par Department of Surgery\par [address] [place] [address]\[place] [address] \par Tel: [phone]\par Fax: [phone]\par Email: [email or web address]

## 2023-03-21 NOTE — HISTORY OF PRESENT ILLNESS
[FreeTextEntry1] : The patient is a 52 year old female with B/L breast masses s/p B/L excisional biopsies with reduction mammoplasty 6/18/2021\par \par Previous history:\par The patient reports that she previously had a Left breast mass in 2004 that was aspirated and reported got smaller/resolved, likely a cyst. She then notes feeling a left breast mass around 12/2019 but did not seek medical attention until recently when she thought it had gotten larger.\par \par Prior imaging:\par 10/23/2020 ([facility]) DM showed heterogeneously dense breasts.\par - R UOQ indeterminate microcalcifications were seen, N10\par - L UOQ 5 cm hyperdense mass, N4\par \par 10/23/2020 US\par  - R 3 x 1.6 x 1.2 cm lobulated isoechoic mass 7:00 N3\par  - L 4.7 x 4.7 x 2.0 lobulated complicated cystic isoechoic mass 2:00 N6\par  - LN negative\par \par 2/11/2021 R stereo bx\par  - hyalinized FA with calcifications, rec 6 mo F/U with mammogram\par \par She has not had any prior breast imaging. Today, the patient denies breast pain, skin changes, or nipple discharge.  She does note that she has longstanding back pain from her large (G cup) breasts.\par \par 7/20/2021 office visit: Pain is well-controlled, only concern currently is a pimple on left nipple and appearance of the incisions. No fevers, chills. Is massaging the incisions with vaseline and bio-oil--painful, but getting better.\par \par 10/22/2021 B/L SM ([facility]) scattered fibroglandular densities \par  - Few scattered benign punctate calcs \par  - No suspicious mass, architectural distortion, or clustered pleomorphic microcalcifications\par \par 10/22/2021 B/L US\par  - Negative. BR2\par \par 11/2/2021:\par The patient is healing well from the breast reduction. She continues to massage the incisions with cocoa butter. Back pain is much improved. Denies any breast symptoms. Today, the patient reports no breast complaints. Denies pain, masses, skin changes, or nipple discharge. \par \par 8/22/2022 B/L DM ([facility]) revealed heterogeneously dense breasts \par - L lateral N4 1 cm region of punctate calcs in a clustered distribution remain indeterminate on mag view-> stereo\par - B/L morphologically benign-appearing calcs which are including rim calcified oil cysts adjacent to the region of palpable concern in the right breast. \par - B/L stable postreduction mammoplasty/mastopexy distortion\par \par 8/22/2022 B/L US \par - B/L 6:00 N8 ill-defined regions of decreased echogenicity in the region of a postop scar-> targeted B/L US in 6 months\par - L 6:00 N8 0.8 x 0.7 x 1.2 cm discrete subdermal hypoechoic mass in the region of the postop scar which may represent evolving fat necrosis. \par - BR4\par \par 9/15/2022 L Stereo ([facility])\par - L (hourglass): fibrocystic changes w/ calcs, concordant \par \par 9/20/2022:\par Pt noticed left breast pain starting April 2022. She was referred to get diagnostic breast imaging in 8/22 which showed indeterminate calcifications now s/p stereo biopsy. Reports that pain has resolved. Denies right breast pain. Also recently s/p hysterectomy for fibroids 6/22.\par \par 3/2/2023 L DM ([facility])\par - L neg; bx clip stable.\par \par 3/2/2023 B/L US\par - R 6:00 N9: 17 x 7 x 12 mm hypoechoic parallel mass at the area of scarring; probably represents fat necrosis and remains probably benign.\par - L 6:00 N8: 13 x 7 x 12 mm hypoechoic parallel mass at the area of scarring; probably represents fat necrosis, is not significantly changed, and remains probably benign.\par - BR3, f/u targeted B/L US in 6 months \par \par Interval History: \par The patient had a lap appy for gangrenous appendicitis in 1/2023- now doing well. No breast complaints.

## 2023-03-21 NOTE — ASSESSMENT
[FreeTextEntry1] : The patient is a 52 year old female with B/L breast masses s/p excisional biopsy with reduction mammoplasty 6/18/2021, doing well. \par \par Recent imaging reveal B/L breast nodules, BR3.\par \par BIRADS 3 findings are considered have a less than or equal to 2% likelihood of malignancy. This category reduces the number of false-positive biopsies and justifies a period of watchful waiting, avoiding unnecessary workup when the likelihood of malignancy is very low. \par \par Ultrasound BI-RADS 3 masses will typically undergo a 6-, 12-, and 24-month surveillance protocol to ensure stability and continued benign appearance. After 24 months of stability, the patient may return to routine screening. If during this surveillance period, the mass decreases in size or demonstrates resolution, it can be downgraded to a BI-RADS 2 (benign). If during the surveillance period the mass grows in size or demonstrates suspicious qualities, then the BI-RADS category may be upgraded to a biopsy recommendation. \par \par Plan:\par  - Next B/L SM and targeted B/L US 8/2023\par  - RTO after imaging \par

## 2023-03-21 NOTE — PHYSICAL EXAM
[Normocephalic] : normocephalic [Atraumatic] : atraumatic [PERRL] : pupils equal, round and reactive to light [Sclera nonicteric] : sclera nonicteric [Supple] : supple [No Supraclavicular Adenopathy] : no supraclavicular adenopathy [No Cervical Adenopathy] : no cervical adenopathy [No Thyromegaly] : no thyromegaly [Examined in the supine and seated position] : examined in the supine and seated position [Symmetrical] : symmetrical [Bra Size: ___] : Bra Size: [unfilled] [None] : no ptosis [No dominant masses] : no dominant masses in right breast  [No dominant masses] : no dominant masses left breast [No Nipple Retraction] : no left nipple retraction [No Nipple Discharge] : no left nipple discharge [Breast Nipple Inversion] : nipples not inverted [Breast Nipple Retraction] : nipples not retracted [Breast Nipple Flattening] : nipples not flattened [Breast Nipple Fissures] : nipples not fissured [Breast Abnormal Lactation (Galactorrhea)] : no galactorrhea [Breast Abnormal Secretion Bloody Fluid] : no bloody discharge [Breast Abnormal Secretion Serous Fluid] : no serous discharge [Breast Abnormal Secretion Opalescent Fluid] : no milky discharge [No Axillary Lymphadenopathy] : no left axillary lymphadenopathy [No Edema] : no edema [No Swelling] : no swelling [Full ROM] : full range of motion [de-identified] : non-labored respirations  [de-identified] : well-healed incisions B/L [de-identified] : inferiorly with nodularity along IMF incision, likely scar tissue

## 2023-10-03 ENCOUNTER — APPOINTMENT (OUTPATIENT)
Dept: SURGICAL ONCOLOGY | Facility: CLINIC | Age: 53
End: 2023-10-03

## 2023-10-10 NOTE — ED ADULT NURSE NOTE - NS ED NOTE ABUSE SUSPICION NEGLECT YN
Called pt to schedule with med provider per therapist. Unable to reach pt, left VM requesting pt to call our office back to schedule.   No

## 2024-02-05 NOTE — PATIENT PROFILE ADULT - NSPROHMDIABETMGMTSTRAT_GEN_A_NUR
Dr. Gray patient. Patient called wanted to get an earlier appointment then the one she has on 2/8/24.  Talked to nurse Oneil and Dr. Gray to let them know. Patient unable to come tomorrow due to prior appointment. Will keep 2/8/24 appointment. The patient was notified.    none

## 2024-02-16 NOTE — ASU PREOP CHECKLIST - BP NONINVASIVE SYSTOLIC (MM HG)
179 I have personally seen and examined this patient. I fully participated in the care of this patient. I have made amendments to the documentation where appropriate and otherwise agree with the history, physical exam, and plan as documented by the I have personally seen and examined this patient. I fully participated in the care of this patient. I have made amendments to the documentation where appropriate and otherwise agree with the history, physical exam, and plan as documented by the I have personally seen and examined this patient. I fully participated in the care of this patient. I have made amendments to the documentation where appropriate and otherwise agree with the history, physical exam, and plan as documented by the I have personally seen and examined this patient. I fully participated in the care of this patient. I have made amendments to the documentation where appropriate and otherwise agree with the history, physical exam, and plan as documented by the I have personally seen and examined this patient. I fully participated in the care of this patient. I have made amendments to the documentation where appropriate and otherwise agree with the history, physical exam, and plan as documented by the

## 2024-02-19 NOTE — ASSESSMENT
[FreeTextEntry1] : MIKE JOHNSON is a 52 year old female who underwent a Laparoscopic appendectomy on 01/09/2023 pathology results are consistent with Gangrenous appendicitis and periappendicitis.\par \par \par Patient is doing well. All surgical incisions are healing well and as expected. There is no evidence of infection or complication, and patient is progressing as expected. Post-operative wound care, activity, restrictions and precautions reinforced.  Pathology results were discussed in detail. Patient was instructed no heavy lifting for 4 weeks. She would like to follow up in one month. OK to see me to ensure she is healing up as expected. Patient's questions and concerns addressed to patient's satisfaction. \par  
Detail Level: Detailed
Quality 431: Preventive Care And Screening: Unhealthy Alcohol Use - Screening: Patient not identified as an unhealthy alcohol user when screened for unhealthy alcohol use using a systematic screening method
Quality 226: Preventive Care And Screening: Tobacco Use: Screening And Cessation Intervention: Tobacco Screening not Performed
Quality 402: Tobacco Use And Help With Quitting Among Adolescents: Patient screened for tobacco and never smoked
Quality 110: Preventive Care And Screening: Influenza Immunization: Influenza immunization was not ordered or administered, reason not given
Quality 130: Documentation Of Current Medications In The Medical Record: Current Medications Documented

## 2024-03-13 NOTE — DISCHARGE NOTE NURSING/CASE MANAGEMENT/SOCIAL WORK - WILL THE PATIENT ACCEPT THE PFIZER COVID-19 VACCINE IF ELIGIBLE AND IT IS AVAILABLE?
The following letter pertains to your most recent diagnostic tests:    Good news! There is no pneumonia identified on this chest x-ray.        Sincerely,    Dr. Chung
No
PRINCIPAL DISCHARGE DIAGNOSIS  Diagnosis: Renal stone  Assessment and Plan of Treatment: You were found to have a stone in your ureter, which is the duct by which urine passes from the kidney to the bladder. The size of the stone was 3 mm. You were starrted on intravenous fluids, given medications for pain control, and started on a medication named Flomax to help pass the stone. You will be discharged on Flomax and an antibiotic called Macrobid. You will take both of these medications daily for 5 days. Please follow-up with the urologist Dr. Luna within 1 week for further stone management.      
PRINCIPAL DISCHARGE DIAGNOSIS  Diagnosis: Renal stone  Assessment and Plan of Treatment: You were found to have a stone in your ureter, which is the duct by which urine passes from the kidney to the bladder. The size of the stone was 3 mm. You were starrted on intravenous fluids, given medications for pain control, and started on a medication named Flomax to help pass the stone. You will be discharged on Flomax and an antibiotic called Macrobid. You will take both of these medications daily for 5 days. Please drink 2L of water every day. Please follow-up with the urologist Dr. Luna within 1 week for further stone management.

## 2025-01-13 NOTE — BRIEF OPERATIVE NOTE - NSICDXBRIEFPROCEDURE_GEN_ALL_CORE_FT
PROCEDURES:  Laparoscopic total hysterectomy with bilateral salpingectomy and cystoscopy 10-Herb-2022 11:58:29  Emilio Malone  
occipital adenopathy.      Cervical: No cervical adenopathy.   Skin:     General: Skin is warm and dry.   Neurological:      General: No focal deficit present.      Mental Status: She is alert and oriented to person, place, and time.      Deep Tendon Reflexes: Reflexes normal.   Psychiatric:         Mood and Affect: Mood normal. Mood is not anxious (stable) or depressed (stable).         Behavior: Behavior normal. Behavior is cooperative.       Assessment:      Diagnosis Orders   1. Vitamin D deficiency  Vitamin D 25 Hydroxy      2. Hashimoto's disease  TSH    T4, Free      3. Perimenopausal        4. Psychophysiological insomnia        5. Anxiety and depression        6. Arthralgia, unspecified joint  External Referral To Physical Therapy      7. Preventative health care  CBC    Comprehensive Metabolic Panel    Hemoglobin A1C    Lipid Panel      8. Chronic midline low back pain, unspecified whether sciatica present  External Referral To Physical Therapy          Plan:     Vitamin D deficiency  - Stable: Medication re-filled as needed, con't medications as prescribed, con't current tx plan  - Please increase foods with Vitamin D, which include cheese, eggs, cereals, yogurt, milk, tofu, any type of beef, salmon or tuna,  spinach, and mushroom.   - continue to follow up with Dr. James.     Hashimoto's disease  - Stable: Medication re-filled as needed, con't medications as prescribed, con't current tx plan  - Continue with synthroid as previously prescribed.   - continue to follow up with endocrinology as recommended    Perimenopausal  Anxiety and depression  Psychophysiological insomnia  - continue lexapro as ordered.  - continue estradiol and progesterone as prescribed by OB/GYN.   - recommended trying magnesium glycinate for sleeping and Remifemin for hot flashes in the past.     Arthralgia, unspecified joint  - will start PT, see below.     Assessment & Plan  Right leg pain: Likely nerve tenderness due to pressure

## (undated) DEVICE — DRSG 2X2

## (undated) DEVICE — LIGASURE MARYLAND 44CM

## (undated) DEVICE — VENODYNE/SCD SLEEVE CALF LARGE

## (undated) DEVICE — DRSG MASTISOL

## (undated) DEVICE — DEVICE PUREVIEW ACTIVE

## (undated) DEVICE — TROCAR COVIDIEN VERSAPORT BLADELESS OPTICAL 15MM STANDARD

## (undated) DEVICE — SOL IRR POUR H2O 1000ML

## (undated) DEVICE — Device

## (undated) DEVICE — PREP CHLORAPREP HI-LITE ORANGE 26ML

## (undated) DEVICE — APPLICATOR SURGICEL LAP TROCAR POINT 2.5MM X 150MM

## (undated) DEVICE — SUT MONOCRYL 4-0 27" PS-2 UNDYED

## (undated) DEVICE — GLV 8 PROTEXIS (WHITE)

## (undated) DEVICE — PACK GENERAL LAPAROSCOPY

## (undated) DEVICE — GLV 6.5 PROTEXIS (WHITE)

## (undated) DEVICE — LIGASURE MARYLAND 37CM

## (undated) DEVICE — SOL IRR POUR NS 0.9% 500ML

## (undated) DEVICE — STAPLER COVIDIEN ENDO GIA STANDARD HANDLE

## (undated) DEVICE — NDL HYPO REGULAR BEVEL 25G X 1.5" (BLUE)

## (undated) DEVICE — TROCAR COVIDIEN VERSAPORT BLADELESS OPTICAL 5MM STANDARD

## (undated) DEVICE — SOL INJ NS 0.9% 500ML 1-PORT

## (undated) DEVICE — WARMING BLANKET UPPER ADULT

## (undated) DEVICE — TROCAR COVIDIEN VERSAPORT BLADELESS OPTICAL 11MM STANDARD

## (undated) DEVICE — ENDOCATCH 10MM SPECIMEN POUCH

## (undated) DEVICE — TUBING SUCTION 20FT

## (undated) DEVICE — PLV/PSP-ESU T7E14761DX: Type: DURABLE MEDICAL EQUIPMENT

## (undated) DEVICE — SYR LUER LOK 30CC

## (undated) DEVICE — SUT POLYSORB 0 30" GU-46

## (undated) DEVICE — SYR LUER LOK 10CC

## (undated) DEVICE — GLV 8 PROTEXIS (BLUE)

## (undated) DEVICE — D HELP - CLEARVIEW CLEARIFY SYSTEM

## (undated) DEVICE — GLV 7.5 PROTEXIS (WHITE)

## (undated) DEVICE — LIGASURE BLUNT TIP 37CM

## (undated) DEVICE — APPLICATOR FOR ARISTA XL 38CM

## (undated) DEVICE — TUBING TUR 2 PRONG

## (undated) DEVICE — SUT VLOC 180 0 6" GS-21 GREEN

## (undated) DEVICE — FOR-ESU VALLEYLAB T7E15009DX: Type: DURABLE MEDICAL EQUIPMENT

## (undated) DEVICE — DRAIN RESERVOIR FOR JACKSON PRATT 100CC CARDINAL

## (undated) DEVICE — TUBING STRYKER PNEUMOSURE HEATED RTP

## (undated) DEVICE — GLV 6.5 PROTEXIS (BLUE)

## (undated) DEVICE — PACK GYN LAPAROSCOPY

## (undated) DEVICE — TUBING STRYKEFLOW II SUCTION / IRRIGATOR

## (undated) DEVICE — VENODYNE/SCD SLEEVE CALF MEDIUM

## (undated) DEVICE — SUT VLOC 180 0 9" GS-21 GREEN

## (undated) DEVICE — SOL IRR POUR NS 0.9% 1000ML

## (undated) DEVICE — PREP KIT SKIN PREP DRY

## (undated) DEVICE — FOLEY TRAY 14FR 5CC LTX BAG CLOSED

## (undated) DEVICE — SUT POLYSORB 2-0 30" GU-46

## (undated) DEVICE — SMOKE EVACUTATION SYS LAPROSCOPIC AC/PA

## (undated) DEVICE — CONTAINER SPECIMEN 4OZ

## (undated) DEVICE — TROCAR COVIDIEN VERSAONE FIXATION CANNULA 5MM

## (undated) DEVICE — SOL IRR POUR NS 0.9% 1500ML

## (undated) DEVICE — DRSG STERISTRIPS 0.5 X 4"

## (undated) DEVICE — ELCTR GROUNDING PAD ADULT COVIDIEN

## (undated) DEVICE — SUT POLYSORB 1 27" GS-21 UNDYED

## (undated) DEVICE — GOWN TRIMAX XXL

## (undated) DEVICE — TUBING STRYKER PNEUMOSURE HI FLOW INSUFFLATOR

## (undated) DEVICE — PSP-SCD MACHINE: Type: DURABLE MEDICAL EQUIPMENT

## (undated) DEVICE — DRAPE 3/4 SHEET W REINFORCEMENT 56X77"

## (undated) DEVICE — APPLICATOR VISTASEAL LAP DUAL 35CM RIGID

## (undated) DEVICE — TROCAR ETHICON ENDOPATH XCEL HASSON BLUNT TIP 12MM X 100MM STABILITY

## (undated) DEVICE — SOL IRR BAG H2O 2000ML

## (undated) DEVICE — SUT BIOSYN 4-0 18" P-12

## (undated) DEVICE — NDL COUNTER FOAM AND MAGNET 20-40

## (undated) DEVICE — NDL HYPO SAFE 25G X 1.5" (ORANGE)

## (undated) DEVICE — CONNECTOR 5 IN1 SUCTION TUBING

## (undated) DEVICE — ELCTR THUNDERBEAT HANDPIECE 5MM X 35CM FRONT CONTROL